# Patient Record
Sex: MALE | Race: BLACK OR AFRICAN AMERICAN | NOT HISPANIC OR LATINO | Employment: UNEMPLOYED | URBAN - METROPOLITAN AREA
[De-identification: names, ages, dates, MRNs, and addresses within clinical notes are randomized per-mention and may not be internally consistent; named-entity substitution may affect disease eponyms.]

---
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
## 2021-02-10 ENCOUNTER — TELEPHONE (OUTPATIENT)
Dept: NEUROLOGY | Facility: CLINIC | Age: 21
End: 2021-02-10

## 2021-02-10 NOTE — TELEPHONE ENCOUNTER
Called to remind patient of their upcoming appointment with Dr Jessica Velasquez in [facility] SPECIAL[facility]  Voicemail left to advise patient to call back with any urgent symptoms or concerns  Patient also made aware that we will be calling back two days prior to appointment to complete the COVID screening

## 2021-02-23 NOTE — PROGRESS NOTES
[facility] Consult - Follow up   PATIENT:  Mosie Epley  MRN:  [id number]  :  2000  DATE OF SERVICE:  2021  REFERRED BY: Tamiko Esteban*  PMD: No primary care provider on file  Assessment/Plan:     Joel Cain is a delightful 20 y  o  male with a past medical history that includes anxiety, asthma as a child and no longer, myofascial pain syndrome, chronic back pain referred here for evaluation of headache  My initial evaluation 2020  Follow-up 2020, 8/3/2020, 2020, 2021       Chronic daily headache  Chronic migraine without aura without status migrainosus, not intractable  Cervicalgia  Anxiety   Zach reports a long history of headaches and migraines as well as a strong family history of migraines  Johan Morgan reports pain is typically retro-orbital, frontal and temporal and may be occipital and parietal   He denies aura and reports typical associated migrainous features without significant autonomic features  - frequency as of 2020: headaches or migraines 25 days a month, more than 50% are migraines he thinks  - As of 2020:  No improvement in headaches or migraines   Just increased amitriptyline to 50 mg last week and will try this for 2 months before declaring whether is effective or not   Rizatriptan may help somewhat as abortive will also add prochlorperazine as the alternative abortive option  - As of 8/3/2020:  No improvement in headaches or migraines amitriptyline and therefore will wean off   Will see what cardiologist thinks at upcoming appointment and may consider starting propranolol next    trial of prochlorperazine for abortive  - As of 2020:  He reports he has not noticed improvement in headaches or migraines although previously he was reporting 25 migraine days per month, today reporting 8 migraine days per month    He is now off amitriptyline and we will try venlafaxine for migraine and headache prevention which may also help with mood  Rizatriptan and prochlorperazine do not seem to do much for headaches per patient  Likely anxiety component headaches as well  - as of 2/24/2021: He continues to have daily headaches that turn into migraines  He self discontinued venlfaxine 75 mg after 3 month trial  Will start trial of propranolol       Workup:  - MRI brain with without contrast 12/27/2019:  Unremarkable     Preventative:  -referral to physical therapy placed 06/01/2020   - we discussed headache hygiene and lifestyle factors that may improve headaches  - trial of propranolol with gradual titration  Discussed proper use, possible side effects and risks  - past/failed: supplements, amitriptyline, venlafaxine 75 mg  - future options:  topiramate, CGRP med      Abortive:  - discussed not taking over-the-counter or prescription pain medications more than 3 days per week to prevent medication overuse/rebound headache  -     rizatriptan (MAXALT) 10 MG tablet; Take 1 tablet (10 mg total) by mouth once as needed for migraine May repeat in 2 hours if needed  Max 2/24 hours, 9/month  Discussed proper use, possible side effects and risks    -  prochlorperazine/Compazine 10 mg as alternative abortive  Discussed proper use, possible side effects and risks  - past/failed:  Methocarbamol, naproxen, ibuprofen  - future options:  Alternative Triptan,  metoclopramide, Toradol IM or p o , could consider trial for 5 days of Depakote or dexamethasone 4 prolonged migraine, bean Siddiqui        Patient instructions     Get Dilated Eye exam   Referral to physical therapy placed again      Headache/migraine treatment:   Abortive medications (for immediate treatment of a headache):    It is ok to take ibuprofen, acetaminophen or naproxen (Advil, Tylenol,  Aleve, Excedrin) if they help your headaches you should limit these to No more than 3 times a week to avoid medication overuse/rebound headaches       Option 1:  For your more moderate to severe migraines take this medication early  Maxalt (rizatriptan) 10mg tabs - take one at the onset of headache  May repeat one time after 1-2 hours if pain has not resolved  (Max 2 a day and 9 a month)     Option 2:   Prochlorperazine /Compazine 10 mg as needed for alternative medicine for headache/migraine or nausea     Prescription preventive medications for headaches/migraines   (to take every day to help prevent headaches - not to take at the time of headache):  [x]? ? trial of propranolol   10 mg BID for 2 weeks, if needed/tolerated increase to 20 mg BID for 1 week, if needed/tolerated increase to 30 mg BID for 1 week, if needed/tolerated increase to 40 mg BID       *Typically these types of medications take time untill you see the benefit, although some may see improvement in days, often it may take weeks, especially if the medication is being titrated up to a beneficial level  Please contact us if there are any concerns or questions regarding the medication          Self-Monitoring:  [x]? ? Headache calendar  Each day marcin a number from 0-10 indicating if there was a headache and how bad it was   This can be used to monitor gradual improvement and is helpful to make medication adjustments  You can do this on paper or there is an JONATHAN for a smart phone called "Migraine e Diary"       Lifestyle Recommendations:  [x]? ? SLEEP - Maintain a regular sleep schedule: Adults need at least 7-8 hours of uninterrupted a night  Maintain good sleep hygiene:  Going to bed and waking up at consistent times, avoiding excessive daytime naps, avoiding caffeinated beverages in the evening, avoid excessive stimulation in the evening and generally using bed primarily for sleeping   One hour before bedtime would recommend turning lights down lower, decreasing your activity (may read quietly, listen to music at a low volume)   When you get into bed, should eliminate all technology (no texting, emailing, playing with your phone, iPad or tablet in bed)  [x]?? HYDRATION - Maintain good hydration   Drink  2L of fluid a day (4 typical small water bottles)  [x]? ? DIET - Maintain good nutrition  In particular don't skip meals and try and eat healthy balanced meals regularly  [x]? ? TRIGGERS - Look for other triggers and avoid them: Limit caffeine to 1-2 cups a day or less  Avoid dietary triggers that you have noticed bring on your headaches (this could include aged cheese, peanuts, MSG, aspartame and nitrates)  [x]? ? EXERCISE - physical exercise as we all know is good for you in many ways, and not only is good for your heart, but also is beneficial for your mental health, cognitive health and  chronic pain/headaches  I would encourage at the least 5 days of physical exercise weekly for at least 30 minutes       Education and Follow-up  [x]? ? Please call with any questions or concerns  Of course if any new concerning symptoms go to the emergency department  [x]? ? Follow up 3 months with Dr Regla Ignacio and 6 months with Daniela Pelayo         CC: We had the pleasure of evaluating Zach Cain in neurological consultation today  Angelo Cain is a  right handed male who presents today for evaluation of headaches       History obtained from patient as well as available medical record review    History of Present Illness:     Interval history as of 02/24/2021  - he did not follow up with PA while I was out as requested  - not working, quit school, "im doing my own research"  - looking into doing aqua therapy for back pain   - dealing with other chronic pains - through other providers - methocarbamol robaxin   - eye exam - next month   - PT  - has not done  - normal TTE in 9/4/20, negative holter, following with cardiology, ED 9/6/20 for palpitations     Headaches/migraines  - daily headaches and turns into migraine  Preventative: venlafaxine 75 mg - tried for 3 months and self discontinued   Abortive: rizatriptan does not seem to help Prochlorperazine - helps lower it down a bit     Interval history as of 08/31/2020:   - saw cardiology 08/12/2020, please see EMR for details, Holter monitor was normal  -ED 08/29/2020 for chest pain, anxiety, EKG - sinus rhythm and diffuse ST T wave changes elevations but it is no different than EKG from 07/21/2020, chest x-ray negative, lab workup unremarkable and they recommended follow-up with cardiology (switching to his dads cardiology at )  - anxiety has been better per patient     Headaches and migraines - unchanged, he reports 8 days a month  Preventative: not on any right now     Abortive: rizatripan or Prochlorperazine - dont seem to do anything         Interval history as of 08/3/2020:  07/20/2020 presented to ED with chest pain for 3 days  -  EKG 7/20/20 sinus rhythm with short WY, nonspecific T-wave abnormality, rate 77,   - prescribed outpatient Holter monitor and cardiology follow-up     07/22/2020 he followed up with primary care provider     He has not seen Physical therapy since last visit   Eye exam - not gone yet      Headaches and migraines - unchanged, nearly daily, frontal/temporal mainly, sometimes in back or diffuse  He continues to take Amitriptyline 50 mg  Rizatriptan - tried it twice and did not help   prochlorperazine - has not tried        Interval history as of 6/1/2020:   Headaches and migraines - he has not yet seen improvement, keeping track  Amitriptyline - at 50 mg nightly - no side effects   Rizatriptan - tried one and helped a little   No new or concerning symptoms         Headaches started at what age? 16years old  How often do the headaches occur?   - as of 4/23/2020: headaches or migraines 25 days a month, more than 50% are migraines he thinks   - As of 6/1/2020: headaches or migraines 25 days a month  - as of 8/3/2020  headaches or migraines 25 days a month  What time of the day do the headaches start?  There when he wakes up or later in the day   How long do the headaches last? Over 6 hours up to 2 days  Are you ever headache free? Yes     Aura? without aura     Last eye exam: years ago     Where is your headache located and pain quality?   - starts with retro-orbital pain, frontal and temporal and occipital and parietal    - usually bilateral   - fuzzy in the beginning, then throbbing and horrible   What is the intensity of pain? Average: 5/10, worst 12/10  Associated symptoms:   [x]? ? Nausea       []? ? Vomiting        []? ?Louetta Shade  [x]? ? Insomnia    []? ? Stiff or sore neck   [x]? ? Problems with concentration  [x]? ? Photophobia     [x]? ?Phonophobia      []? ? Osmophobia  []? ? Blurred vision   [x]? ? Prefer quiet, dark room  [x]? ? Light-headed or dizzy     []? ? Tinnitus   []? ? Hands or feet tingle or feel numb/paresthesias       []? ? Red ear      []? ? Ptosis      []? ? Facial droop  [x]? ? Lacrimation - rarely   []? ? Nasal congestion/rhinorrhea   []? ? Flushing of face  []? ? Change in pupil size     Number of days missed per month because of headaches:  Work (or school) days:  2-3 weeks in a row once before, 10 days on avg in a month      Things that make the headache worse? No specific movements, any movement      Headache triggers:  Lights, sounds      Have you seen someone else for headaches or pain? Yes, PCP   Have you had trigger point injection performed and how often? No  Have you had Botox injection performed and how often? No   Have you had epidural injections or transforaminal injections performed? No  Have you ever had any Brain imaging? Yes     What medications do you take or have you taken for your headaches?    ABORTIVE:    OTC medications have been ineffective      naproxen  Methocarbamol for back pain - thinks he takes every day - does not help   Rizatriptan has not seemed to help   exedrin helped in the past      PREVENTIVE:         Magnesium     Past:  Rizatriptan      Alternative therapies used in the past for headaches? no other headache interventions have been tried        LIFESTYLE  Sleep   - averages: 5 hours  Problems falling asleep?:   Yes  Problems staying asleep?:  Yes        Physical activity: stretches for PT, back makes other exercise hard      Water: 8 bottles per day  Caffeine: 1 per day     Mood:   - anxiety, maybe mild depression, no SI     The following portions of the patient's history were reviewed and updated as appropriate: allergies, current medications, past family history, past medical history, past social history, past surgical history and problem list      Pertinent family history:  Family history of headaches:  migraine headaches in mother and father  Any family history of aneurysms - No     Pertinent social history:  Melanie Stephenson fall 2020 [employer] studying media production, [employer] design, [employer]  Lives with mom, dad, sister      Illicit Drugs: denies  Alcohol/tobacco: Denies alcohol use, Denies tobacco use      Past Medical History:     Past Medical History:   Diagnosis Date    Anxiety     Asthma     Migraines        Patient Active Problem List   Diagnosis    Low back pain    Myofascial pain syndrome    Nausea    Anxiety    Failed vision screen    Other headache syndrome    Palpitations       Medications:      Current Outpatient Medications   Medication Sig Dispense Refill    co-enzyme Q-10 30 mg Take 30 mg by mouth Three times a day      LORazepam (Ativan) 1 mg tablet Take 1 tablet (1 mg total) by mouth 3 (three) times a day as needed for anxiety 15 tablet 0    multivitamin (THERAGRAN) TABS Take 1 tablet by mouth daily      naproxen (NAPROSYN) 500 mg tablet TAKE 1/2 TABLET BY MOUTH TWICE A DAY WITH MEALS 15 tablet 0    prochlorperazine (COMPAZINE) 10 mg tablet Take 1 tablet (10 mg total) by mouth every 6 (six) hours as needed (migraine) 12 tablet 3    rizatriptan (MAXALT) 10 MG tablet Take 1 tablet (10 mg total) by mouth once as needed for migraine May repeat in 2 hours if needed  Max 2/24 hours, 9/month  9 tablet 3    venlafaxine (EFFEXOR-XR) 37 5 mg 24 hr capsule Take 1 capsule (37 5 mg total) by mouth daily for 7 days Then increase to 75 mg daily 7 capsule 0    venlafaxine (EFFEXOR-XR) 75 mg 24 hr capsule Take 1 capsule (75 mg total) by mouth daily 30 capsule 3     No current facility-administered medications for this visit           Allergies:    No Known Allergies    Family History:     Family History   Problem Relation Age of Onset   Kristy Seller Migraines Mother     Hypertension Father     Migraines Father     Migraines Maternal Grandmother     Migraines Maternal Grandfather        Social History:     Social History     Socioeconomic History    Marital status: Unknown     Spouse name: Not on file    Number of children: Not on file    Years of education: Not on file    Highest education level: Not on file   Occupational History    Not on file   Social Needs    Financial resource strain: Not on file    Food insecurity     Worry: Not on file     Inability: Not on file    Transportation needs     Medical: Not on file     Non-medical: Not on file   Tobacco Use    Smoking status: Never Smoker    Smokeless tobacco: Never Used   Substance and Sexual Activity    Alcohol use: No    Drug use: Yes     Types: Marijuana     Comment: occasional    Sexual activity: Yes     Partners: Female     Birth control/protection: Condom Male     Comment: [phone] is Robin's personal cellular number   Lifestyle    Physical activity     Days per week: Not on file     Minutes per session: Not on file    Stress: Not on file   Relationships    Social connections     Talks on phone: Not on file     Gets together: Not on file     Attends Yazidism service: Not on file     Active member of club or organization: Not on file     Attends meetings of clubs or organizations: Not on file     Relationship status: Not on file    Intimate partner violence     Fear of current or ex partner: Not on file     Emotionally abused: Not on file Physically abused: Not on file     Forced sexual activity: Not on file   Other Topics Concern    Not on file   Social History Narrative    Lives with Mom, Dad , sister (older), dog- pit bull -         No stressors noted        Noted to be a worrier- anxiety- longstanding-    No treatment - in past nor now  Is interested in therapy! Objective:       Physical Exam:                                                                 Vitals:            Constitutional:    /72 (BP Location: Left arm, Patient Position: Sitting, Cuff Size: Standard)   Pulse 72   Ht 5' 11" (1 803 m)   Wt 77 1 kg (170 lb)   BMI 23 71 kg/m²   BP Readings from Last 3 Encounters:   02/24/21 130/72   09/28/20 124/70   09/06/20 130/64     Pulse Readings from Last 3 Encounters:   02/24/21 72   09/28/20 85   09/06/20 60         Well developed, well nourished, well groomed  No dysmorphic features  Psychiatric:  Normal behavior and appropriate affect        Neurological Examination:     Mental status/cognitive function:   Recent and remote memory intact  Attention span and concentration as well as fund of knowledge are appropriate for age  Normal language and spontaneous speech  Cranial Nerves:  III, IV, VI-Pupils were equal, round  Extraocular movements were full and conjugate   VII-facial expression symmetric  VIII-hearing grossly intact bilaterally   Motor Exam: symmetric bulk throughout  no atrophy, fasciculations or abnormal movements noted     Coordination:  no apparent dysmetria, ataxia or tremor noted  Gait: steady casual gait        Pertinent lab results:   08/29/2020 BMP and CBC unremarkable     07/20/2020 BMP unremarkable  CBC significant for hemoglobin 17 5  Troponin negative  TSH slightly low at 0 4, free T4 normal 0 8, free T3 slightly low  COVID negative    EKG 7/20/20 sinus rhythm with short WY, nonspecific T-wave abnormality, rate 77,      Imaging:   I have personally reviewed imaging and radiology read   - MRI brain with without contrast 12/27/2019:  Unremarkable  Review of Systems:   ROS obtained by medical assistant Personally reviewed and updated if indicated  Review of Systems   Constitutional: Negative  Negative for appetite change and fever  HENT: Negative  Negative for hearing loss, tinnitus, trouble swallowing and voice change  Eyes: Positive for photophobia  Negative for pain (both eyes)  Respiratory: Negative  Negative for shortness of breath  Cardiovascular: Negative  Negative for palpitations  Gastrointestinal: Negative  Negative for nausea and vomiting  Endocrine: Negative  Negative for cold intolerance  Genitourinary: Negative  Negative for dysuria, frequency and urgency  Musculoskeletal: Negative  Negative for myalgias and neck pain  Skin: Negative  Negative for rash  Neurological: Positive for light-headedness and headaches (daily)  Negative for dizziness, tremors, seizures, syncope, facial asymmetry, speech difficulty, weakness and numbness  Hematological: Negative  Does not bruise/bleed easily  Psychiatric/Behavioral: Negative  Negative for confusion, hallucinations and sleep disturbance  I have spent 23 minutes with Patient  today in which greater than 50% of this time was spent in counseling/coordination of care  I also spent 10 minutes non face to face for this patient the same day         Author:  John Paul Sarah MD 2/24/2021 12:42 PM

## 2021-02-24 ENCOUNTER — OFFICE VISIT (OUTPATIENT)
Dept: NEUROLOGY | Facility: CLINIC | Age: 21
End: 2021-02-24
Payer: COMMERCIAL

## 2021-02-24 VITALS
HEIGHT: 71 IN | BODY MASS INDEX: 23.8 KG/M2 | SYSTOLIC BLOOD PRESSURE: 130 MMHG | HEART RATE: 72 BPM | DIASTOLIC BLOOD PRESSURE: 72 MMHG | WEIGHT: 170 LBS

## 2021-02-24 DIAGNOSIS — M54.2 CERVICALGIA: ICD-10-CM

## 2021-02-24 DIAGNOSIS — R51.9 CHRONIC DAILY HEADACHE: Primary | ICD-10-CM

## 2021-02-24 DIAGNOSIS — G43.709 CHRONIC MIGRAINE WITHOUT AURA WITHOUT STATUS MIGRAINOSUS, NOT INTRACTABLE: ICD-10-CM

## 2021-02-24 PROCEDURE — 99214 OFFICE O/P EST MOD 30 MIN: CPT | Performed by: PSYCHIATRY & NEUROLOGY

## 2021-02-24 PROCEDURE — 3008F BODY MASS INDEX DOCD: CPT | Performed by: PSYCHIATRY & NEUROLOGY

## 2021-02-24 RX ORDER — METHOCARBAMOL 750 MG/1
750 TABLET, FILM COATED ORAL 4 TIMES DAILY PRN
COMMUNITY
Start: 2021-02-15

## 2021-02-24 RX ORDER — PROPRANOLOL HYDROCHLORIDE 10 MG/1
TABLET ORAL
Qty: 240 TABLET | Refills: 3 | Status: SHIPPED | OUTPATIENT
Start: 2021-02-24

## 2021-02-24 RX ORDER — MELOXICAM 15 MG/1
15 TABLET ORAL DAILY
COMMUNITY
Start: 2021-02-15

## 2021-02-24 RX ORDER — PROPRANOLOL HYDROCHLORIDE 10 MG/1
TABLET ORAL
Qty: 240 TABLET | Refills: 3 | Status: SHIPPED | OUTPATIENT
Start: 2021-02-24 | End: 2021-02-24 | Stop reason: SDUPTHER

## 2021-02-24 NOTE — PROGRESS NOTES
Review of Systems    {LimROS-complete:83253}      Review of Systems   Constitutional: Negative  Negative for appetite change and fever  HENT: Negative  Negative for hearing loss, tinnitus, trouble swallowing and voice change  Eyes: Positive for photophobia  Negative for pain (both eyes)  Respiratory: Negative  Negative for shortness of breath  Cardiovascular: Negative  Negative for palpitations  Gastrointestinal: Negative  Negative for nausea and vomiting  Endocrine: Negative  Negative for cold intolerance  Genitourinary: Negative  Negative for dysuria, frequency and urgency  Musculoskeletal: Negative  Negative for myalgias and neck pain  Skin: Negative  Negative for rash  Neurological: Positive for light-headedness and headaches (daily)  Negative for dizziness, tremors, seizures, syncope, facial asymmetry, speech difficulty, weakness and numbness  Hematological: Negative  Does not bruise/bleed easily  Psychiatric/Behavioral: Negative  Negative for confusion, hallucinations and sleep disturbance

## 2021-02-24 NOTE — PATIENT INSTRUCTIONS
Get Dilated Eye exam   Referral to physical therapy placed again      Headache/migraine treatment:   Abortive medications (for immediate treatment of a headache): It is ok to take ibuprofen, acetaminophen or naproxen (Advil, Tylenol,  Aleve, Excedrin) if they help your headaches you should limit these to No more than 3 times a week to avoid medication overuse/rebound headaches       Option 1:  For your more moderate to severe migraines take this medication early  Maxalt (rizatriptan) 10mg tabs - take one at the onset of headache  May repeat one time after 1-2 hours if pain has not resolved  (Max 2 a day and 9 a month)     Option 2:   Prochlorperazine /Compazine 10 mg as needed for alternative medicine for headache/migraine or nausea     Prescription preventive medications for headaches/migraines   (to take every day to help prevent headaches - not to take at the time of headache):  [x]? ? trial of propranolol   10 mg BID for 2 weeks, if needed/tolerated increase to 20 mg BID for 1 week, if needed/tolerated increase to 30 mg BID for 1 week, if needed/tolerated increase to 40 mg BID         *Typically these types of medications take time untill you see the benefit, although some may see improvement in days, often it may take weeks, especially if the medication is being titrated up to a beneficial level  Please contact us if there are any concerns or questions regarding the medication          Self-Monitoring:  [x]? ? Headache calendar  Each day marcin a number from 0-10 indicating if there was a headache and how bad it was   This can be used to monitor gradual improvement and is helpful to make medication adjustments  You can do this on paper or there is an JONATHAN for a smart phone called "Migraine e Diary"       Lifestyle Recommendations:  [x]? ? SLEEP - Maintain a regular sleep schedule: Adults need at least 7-8 hours of uninterrupted a night   Maintain good sleep hygiene:  Going to bed and waking up at consistent times, avoiding excessive daytime naps, avoiding caffeinated beverages in the evening, avoid excessive stimulation in the evening and generally using bed primarily for sleeping   One hour before bedtime would recommend turning lights down lower, decreasing your activity (may read quietly, listen to music at a low volume)  When you get into bed, should eliminate all technology (no texting, emailing, playing with your phone, iPad or tablet in bed)  [x]?? HYDRATION - Maintain good hydration   Drink  2L of fluid a day (4 typical small water bottles)  [x]? ? DIET - Maintain good nutrition  In particular don't skip meals and try and eat healthy balanced meals regularly  [x]? ? TRIGGERS - Look for other triggers and avoid them: Limit caffeine to 1-2 cups a day or less  Avoid dietary triggers that you have noticed bring on your headaches (this could include aged cheese, peanuts, MSG, aspartame and nitrates)  [x]? ? EXERCISE - physical exercise as we all know is good for you in many ways, and not only is good for your heart, but also is beneficial for your mental health, cognitive health and  chronic pain/headaches  I would encourage at the least 5 days of physical exercise weekly for at least 30 minutes       Education and Follow-up  [x]? ? Please call with any questions or concerns  Of course if any new concerning symptoms go to the emergency department  [x]? ? Follow up 3 months with Dr Juan Carlos Torres and 6 months Harris Garsia

## 2021-05-17 ENCOUNTER — TELEPHONE (OUTPATIENT)
Dept: NEUROLOGY | Facility: CLINIC | Age: 21
End: 2021-05-17

## 2021-05-17 NOTE — TELEPHONE ENCOUNTER
Called and left a voicemail for patient - Please call back to confirm upcoming appointment with Dr Carlos Enrique Encinas  Provided patient with apt date, time and location  Informed patient that check in is at least 15 minutes prior to apt time

## 2021-05-24 ENCOUNTER — TRANSCRIBE ORDERS (OUTPATIENT)
Dept: NEUROLOGY | Facility: CLINIC | Age: 21
End: 2021-05-24

## 2021-05-24 DIAGNOSIS — G43.709 CHRONIC MIGRAINE WITHOUT AURA, NOT INTRACTABLE, WITHOUT STATUS MIGRAINOSUS: ICD-10-CM

## 2021-05-24 DIAGNOSIS — R51.9 HEADACHE, UNSPECIFIED: Primary | ICD-10-CM

## 2021-05-24 DIAGNOSIS — M54.2 CERVICALGIA: ICD-10-CM

## 2021-05-26 ENCOUNTER — OFFICE VISIT (OUTPATIENT)
Dept: NEUROLOGY | Facility: CLINIC | Age: 21
End: 2021-05-26
Payer: COMMERCIAL

## 2021-05-26 VITALS
HEART RATE: 69 BPM | HEIGHT: 71 IN | DIASTOLIC BLOOD PRESSURE: 81 MMHG | SYSTOLIC BLOOD PRESSURE: 135 MMHG | WEIGHT: 172 LBS | BODY MASS INDEX: 24.08 KG/M2

## 2021-05-26 DIAGNOSIS — M54.2 CERVICALGIA: ICD-10-CM

## 2021-05-26 DIAGNOSIS — R51.9 HEADACHE, UNSPECIFIED: ICD-10-CM

## 2021-05-26 DIAGNOSIS — G43.709 CHRONIC MIGRAINE WITHOUT AURA, NOT INTRACTABLE, WITHOUT STATUS MIGRAINOSUS: ICD-10-CM

## 2021-05-26 PROCEDURE — 3008F BODY MASS INDEX DOCD: CPT | Performed by: PSYCHIATRY & NEUROLOGY

## 2021-05-26 PROCEDURE — 99214 OFFICE O/P EST MOD 30 MIN: CPT | Performed by: PSYCHIATRY & NEUROLOGY

## 2021-05-26 PROCEDURE — 1036F TOBACCO NON-USER: CPT | Performed by: PSYCHIATRY & NEUROLOGY

## 2021-05-26 NOTE — PROGRESS NOTES
Review of Systems   Constitutional: Negative  Negative for appetite change and fever  HENT: Negative  Negative for hearing loss, tinnitus, trouble swallowing and voice change  Eyes: Negative  Negative for photophobia and pain  Respiratory: Negative  Negative for shortness of breath  Cardiovascular: Negative  Negative for palpitations  Gastrointestinal: Negative  Negative for nausea and vomiting  Endocrine: Negative  Negative for cold intolerance  Genitourinary: Negative  Negative for dysuria, frequency and urgency  Musculoskeletal: Negative  Negative for myalgias and neck pain  Skin: Negative  Negative for rash  Neurological: Positive for headaches (3 per week)  Negative for dizziness, tremors, seizures, syncope, facial asymmetry, speech difficulty, weakness, light-headedness and numbness  Hematological: Negative  Does not bruise/bleed easily  Psychiatric/Behavioral: Negative  Negative for confusion, hallucinations and sleep disturbance

## 2021-05-26 NOTE — PROGRESS NOTES
[address] [facility] Consult - Follow up   PATIENT:  Frankie Lin  MRN:  [id number]  :  2000  DATE OF SERVICE:  2021  REFERRED BY: Court Underwood DO  PMD: Bhargav Tavarez DO    Assessment/Plan:   Kimberly Cain is a delightful 20 y  o  male with a past medical history that includes anxiety, asthma as a child and no longer, myofascial pain syndrome, chronic back pain referred here for evaluation of headache  My initial evaluation 2020  Follow-up 2020, 8/3/2020, 2020, 2021, 2021     Chronic daily headache  Chronic migraine without aura without status migrainosus, not intractable  Anxiety   Zach reports a long history of headaches and migraines as well as a strong family history of migraines  Duke Hernandez reports pain is typically retro-orbital, frontal and temporal and may be occipital and parietal   He denies aura and reports typical associated migrainous features without significant autonomic features  - frequency as of 2020: headaches or migraines 25 days a month, more than 50% are migraines he thinks  - As of 2020:  No improvement in headaches or migraines   Just increased amitriptyline to 50 mg last week and will try this for 2 months before declaring whether is effective or not   Rizatriptan may help somewhat as abortive will also add prochlorperazine as the alternative abortive option  - As of 8/3/2020:  No improvement in headaches or migraines amitriptyline and therefore will wean off   Will see what cardiologist thinks at upcoming appointment and may consider starting propranolol next    trial of prochlorperazine for abortive  - As of 2020:  He reports he has not noticed improvement in headaches or migraines although previously he was reporting 25 migraine days per month, today reporting 8 migraine days per month  Duke Hernandez is now off amitriptyline and we will try venlafaxine for migraine and headache prevention which may also help with mood   Rizatriptan and prochlorperazine do not seem to do much for headaches per patient  Mitchell Shah anxiety component headaches as well  - as of 2/24/2021: He continues to have daily headaches that turn into migraines  He self discontinued venlfaxine 75 mg after 3 month trial  Will start trial of propranolol    - as of 5/26/2021: He reports he is finally doing better with about 3 headaches per week that are not as bad on propranolol 20 mg BID  No migraines in past 3 months severe enough to take rizatriptan or prochlorperazine  Workup:  - MRI brain with without contrast 12/27/2019:  Unremarkable     Preventative:  -referral to physical therapy placed 06/01/2020   - we discussed headache hygiene and lifestyle factors that may improve headaches  - continue propranolol 20 mg BID  Room to increase if needed  Discussed proper use, possible side effects and risks  - past/failed: supplements, amitriptyline, venlafaxine 75 mg  - future options:  topiramate, CGRP med      Abortive:  - discussed not taking over-the-counter or prescription pain medications more than 3 days per week to prevent medication overuse/rebound headache  -     rizatriptan (MAXALT) 10 MG tablet; Take 1 tablet (10 mg total) by mouth once as needed for migraine May repeat in 2 hours if needed  Max 2/24 hours, 9/month  Discussed proper use, possible side effects and risks    -  prochlorperazine/Compazine 10 mg as alternative abortive  Discussed proper use, possible side effects and risks  - past/failed:  Methocarbamol, naproxen, ibuprofen  - future options:  Alternative Triptan,  metoclopramide, Toradol IM or p o , could consider trial for 5 days of Depakote or dexamethasone 4 prolonged migraine, ubrelvy, reyvow        Patient instructions      Referral to physical therapy     Headache/migraine treatment:   Abortive medications (for immediate treatment of a headache):    It is ok to take ibuprofen, acetaminophen or naproxen (Advil, Tylenol,  Aleve, Excedrin) if they help your headaches you should limit these to No more than 3 times a week to avoid medication overuse/rebound headaches       Option 1:  For your more moderate to severe migraines take this medication early  Maxalt (rizatriptan) 10mg tabs - take one at the onset of headache  May repeat one time after 1-2 hours if pain has not resolved  (Max 2 a day and 9 a month)      Option 2:   Prochlorperazine /Compazine 10 mg as needed for alternative medicine for headache/migraine or nausea     Prescription preventive medications for headaches/migraines   (to take every day to help prevent headaches - not to take at the time of headache):  [x]? ??propranolol   10 mg BID for 2 weeks, if needed/tolerated increase to 20 mg BID   (if needed/tolerated increase to 30 mg BID for 1 week, if needed/tolerated increase to 40 mg BID)     *Typically these types of medications take time untill you see the benefit, although some may see improvement in days, often it may take weeks, especially if the medication is being titrated up to a beneficial level  Please contact us if there are any concerns or questions regarding the medication         Lifestyle Recommendations:  [x]? ?? SLEEP - Maintain a regular sleep schedule: Adults need at least 7-8 hours of uninterrupted a night  Maintain good sleep hygiene:  Going to bed and waking up at consistent times, avoiding excessive daytime naps, avoiding caffeinated beverages in the evening, avoid excessive stimulation in the evening and generally using bed primarily for sleeping   One hour before bedtime would recommend turning lights down lower, decreasing your activity (may read quietly, listen to music at a low volume)  When you get into bed, should eliminate all technology (no texting, emailing, playing with your phone, iPad or tablet in bed)  [x]??? HYDRATION - Maintain good hydration   Drink  2L of fluid a day (4 typical small water bottles)  [x]? ?? DIET - Maintain good nutrition   In particular don't skip meals and try and eat healthy balanced meals regularly  [x]? ?? TRIGGERS - Look for other triggers and avoid them: Limit caffeine to 1-2 cups a day or less  Avoid dietary triggers that you have noticed bring on your headaches (this could include aged cheese, peanuts, MSG, aspartame and nitrates)  [x]? ?? EXERCISE - physical exercise as we all know is good for you in many ways, and not only is good for your heart, but also is beneficial for your mental health, cognitive health and  chronic pain/headaches  I would encourage at the least 5 days of physical exercise weekly for at least 30 minutes       Education and Follow-up  [x]? ?? Please call with any questions or concerns  Of course if any new concerning symptoms go to the emergency department  [x]??? Follow up as scheduled Nathen Larson - 9/1/21 and then if doing well can see me in 6 months after or 3 months if not doing well        CC: We had the pleasure of evaluating Zach Cain in neurological consultation today  Rosalinda Sandhoff Pyles is a  right handed male who presents today for evaluation of headaches       History obtained from patient as well as available medical record review    History of Present Illness:   Interval history as of 5/26/2021  - denies any new or concerning neurologic symptoms since last visit  - working on getting license    -   Eye exam - needed glasses   -  Following with physiatry for chronic back pain  - did not try PT for headaches    Headaches and migraines   - He reports he is finally doing better with about 3 headaches per week that are not as bad either 6 5/10  Preventative:   Trial of propranolol - taking 20 mg BID  Abortive:  rizatriptan - has not tried again   Prochlorperazine - has not needed   Denies bothersome side effects    - also having less palpitations         Interval history as of 02/24/2021  - he did not follow up with PA while I was out as requested  - not working, quit school, "im doing my own research"  - looking into doing aqua therapy for back pain   - dealing with other chronic pains - through other providers - methocarbamol robaxin   - eye exam - next month   - PT  - has not done  - normal TTE in 9/4/20, negative holter, following with cardiology, ED 9/6/20 for palpitations      Headaches/migraines  - daily headaches and turns into migraine  Preventative: venlafaxine 75 mg - tried for 3 months and self discontinued   Abortive: rizatriptan does not seem to help   Prochlorperazine - helps lower it down a bit     Interval history as of 08/31/2020:   - saw cardiology 08/12/2020, please see EMR for details, Holter monitor was normal  -ED 08/29/2020 for chest pain, anxiety, EKG - sinus rhythm and diffuse ST T wave changes elevations but it is no different than EKG from 07/21/2020, chest x-ray negative, lab workup unremarkable and they recommended follow-up with cardiology (switching to his [facility] cardiology at )  - anxiety has been better per patient     Headaches and migraines - unchanged, he reports 8 days a month  Preventative: not on any right now     Abortive: rizatripan or Prochlorperazine - dont seem to do anything         Interval history as of 08/3/2020:  07/20/2020 presented to ED with chest pain for 3 days  -  EKG 7/20/20 sinus rhythm with short UT, nonspecific T-wave abnormality, rate 77,   - prescribed outpatient Holter monitor and cardiology follow-up     07/22/2020 he followed up with primary care provider     He has not seen Physical therapy since last visit   Eye exam - not gone yet      Headaches and migraines - unchanged, nearly daily, frontal/temporal mainly, sometimes in back or diffuse  He continues to take Amitriptyline 50 mg  Rizatriptan - tried it twice and did not help   prochlorperazine - has not tried        Interval history as of 6/1/2020:   Headaches and migraines - he has not yet seen improvement, keeping track  Amitriptyline - at 50 mg nightly - no side effects   Rizatriptan - tried one and helped a little   No new or concerning symptoms         Headaches started at what age? 16years old  How often do the headaches occur?   - as of 4/23/2020: headaches or migraines 25 days a month, more than 50% are migraines he thinks   - As of 6/1/2020: headaches or migraines 25 days a month  - as of 8/3/2020  headaches or migraines 25 days a month  What time of the day do the headaches start?  There when he wakes up or later in the day   How long do the headaches last? Over 6 hours up to 2 days  Are you ever headache free? Yes     Aura? without aura     Last eye exam: years ago     Where is your headache located and pain quality?   - starts with retro-orbital pain, frontal and temporal and occipital and parietal    - usually bilateral   - fuzzy in the beginning, then throbbing and horrible   What is the intensity of pain? Average: 5/10, worst 12/10  Associated symptoms:   [x]? ?? Nausea       []??? Vomiting        []??? Diarrhea  [x]? ?? Insomnia    []??? Stiff or sore neck   [x]? ?? Problems with concentration  [x]? ?? Photophobia     [x]? ? ? Phonophobia      []? ?? Osmophobia  []??? Blurred vision   [x]? ?? Prefer quiet, dark room  [x]??? Light-headed or dizzy     []??? Tinnitus   []? ?? Hands or feet tingle or feel numb/paresthesias       []? ?? Red ear      []? ?? Ptosis      []??? Facial droop  [x]? ?? Lacrimation - rarely   []? ?? Nasal congestion/rhinorrhea   []? ?? Flushing of face  []? ?? Change in pupil size     Number of days missed per month because of headaches:  Work (or school) days:  2-3 weeks in a row once before, 10 days on avg in a month      Things that make the headache worse? No specific movements, any movement      Headache triggers:  Lights, sounds      Have you seen someone else for headaches or pain? Yes, PCP   Have you had trigger point injection performed and how often? No  Have you had Botox injection performed and how often? No   Have you had epidural injections or transforaminal injections performed? No  Have you ever had any Brain imaging? Yes     What medications do you take or have you taken for your headaches?    ABORTIVE:    OTC medications have been ineffective      naproxen  Methocarbamol for back pain - thinks he takes every day - does not help   Rizatriptan has not seemed to help   exedrin helped in the past      PREVENTIVE:         Magnesium     Past:  Rizatriptan      Alternative therapies used in the past for headaches? no other headache interventions have been tried        LIFESTYLE  Sleep   - averages: 5 hours  Problems falling asleep?:   Yes  Problems staying asleep?:  Yes        Physical activity: stretches for PT, back makes other exercise hard      Water: 8 bottles per day  Caffeine: 1 per day     Mood:   - anxiety, maybe mild depression, no SI     The following portions of the patient's history were reviewed and updated as appropriate: allergies, current medications, past family history, past medical history, past social history, past surgical history and problem list      Pertinent family history:  Family history of headaches:  migraine headaches in mother and father  Any family history of aneurysms - No     Pertinent social history:  Education: starting fall 2020 [employer] studying media production, [employer], [employer]  Lives with mom, dad, sister      Illicit Drugs: denies  Alcohol/tobacco: Denies alcohol use, Denies tobacco use      Past Medical History:     Past Medical History:   Diagnosis Date    Anxiety     Asthma     Migraines        Patient Active Problem List   Diagnosis    Low back pain    Myofascial pain syndrome    Nausea    Anxiety    Failed vision screen    Other headache syndrome    Palpitations       Medications:      Current Outpatient Medications   Medication Sig Dispense Refill    meloxicam (MOBIC) 15 mg tablet Take 15 mg by mouth daily      methocarbamol (ROBAXIN) 750 mg tablet Take 750 mg by mouth 4 (four) times a day as needed      multivitamin (THERAGRAN) TABS Take 1 tablet by mouth daily      naproxen (NAPROSYN) 500 mg tablet TAKE 1/2 TABLET BY MOUTH TWICE A DAY WITH MEALS 15 tablet 0    prochlorperazine (COMPAZINE) 10 mg tablet Take 1 tablet (10 mg total) by mouth every 6 (six) hours as needed (migraine) 12 tablet 3    propranolol (INDERAL) 10 mg tablet 10 mg BID for 2 weeks, if needed/tolerated increase to 20 mg BID for 1 week, if needed/tolerated increase to 30 mg BID for 1 week, if needed/tolerated increase to 40 mg BID (Patient taking differently: Take 20 mg by mouth 2 (two) times a day 10 mg BID for 2 weeks, if needed/tolerated increase to 20 mg BID for 1 week, if needed/tolerated increase to 30 mg BID for 1 week, if needed/tolerated increase to 40 mg BID) 240 tablet 3    rizatriptan (MAXALT) 10 MG tablet Take 1 tablet (10 mg total) by mouth once as needed for migraine May repeat in 2 hours if needed  Max 2/24 hours, 9/month  9 tablet 3     No current facility-administered medications for this visit           Allergies:    No Known Allergies    Family History:     Family History   Problem Relation Age of Onset   Earna Emily Migraines Mother     Hypertension Father     Migraines Father     Migraines Maternal Grandmother     Migraines Maternal Grandfather        Social History:     Social History     Socioeconomic History    Marital status: Unknown     Spouse name: Not on file    Number of children: Not on file    Years of education: Not on file    Highest education level: Not on file   Occupational History    Not on file   Social Needs    Financial resource strain: Not on file    Food insecurity     Worry: Not on file     Inability: Not on file    Transportation needs     Medical: Not on file     Non-medical: Not on file   Tobacco Use    Smoking status: Former Smoker     Quit date:      Years since quittin 4    Smokeless tobacco: Never Used   Substance and Sexual Activity    Alcohol use: No    Drug use: Yes     Types: Marijuana     Comment: occasional    Sexual activity: Yes     Partners: Female     Birth control/protection: Condom Male     Comment: [phone] is Zach's personal cellular number   Lifestyle    Physical activity     Days per week: Not on file     Minutes per session: Not on file    Stress: Not on file   Relationships    Social connections     Talks on phone: Not on file     Gets together: Not on file     Attends Buddhism service: Not on file     Active member of club or organization: Not on file     Attends meetings of clubs or organizations: Not on file     Relationship status: Not on file    Intimate partner violence     Fear of current or ex partner: Not on file     Emotionally abused: Not on file     Physically abused: Not on file     Forced sexual activity: Not on file   Other Topics Concern    Not on file   Social History Narrative    Lives with Mom, Dad , sister (older), dog- pit bull -         No stressors noted        Noted to be a worrier- anxiety- longstanding-    No treatment - in past nor now  Is interested in therapy! Objective:       Physical Exam:                                                                 Vitals:            Constitutional:    /81 (BP Location: Left arm, Patient Position: Sitting, Cuff Size: Standard)   Pulse 69   Ht 5' 11" (1 803 m)   Wt 78 kg (172 lb)   BMI 23 99 kg/m²   BP Readings from Last 3 Encounters:   05/26/21 135/81   02/24/21 130/72   09/28/20 124/70     Pulse Readings from Last 3 Encounters:   05/26/21 69   02/24/21 72   09/28/20 85         Well developed, well nourished, well groomed  No dysmorphic features  HEENT:  Normocephalic atraumatic  See neuro exam   Chest:  Respirations appear regular and unlabored  Cardiovascular:  Regular rate, no observed significant swelling  Musculoskeletal:  (see below under neurologic exam for evaluation of motor function and gait)   Skin:  warm and dry, not diaphoretic   No apparent birthmarks or stigmata of neurocutaneous disease  Psychiatric:  Normal behavior and appropriate affect        Neurological Examination:     Mental status/cognitive function:   Recent and remote memory intact  Attention span and concentration as well as fund of knowledge are appropriate for age  Normal language and spontaneous speech  Cranial Nerves:  III, IV, VI-Pupils were equal, round  Extraocular movements were full and conjugate   VII-facial expression symmetric  VIII-hearing grossly intact bilaterally   Motor Exam: symmetric bulk throughout  no atrophy, fasciculations or abnormal movements noted  Coordination:  no apparent dysmetria, ataxia or tremor noted  Gait: steady casual gait         Pertinent lab results:   08/29/2020 BMP and CBC unremarkable     07/20/2020 BMP unremarkable  CBC significant for hemoglobin 17 5  Troponin negative  TSH slightly low at 0 4, free T4 normal 0 8, free T3 slightly low  COVID negative    EKG 7/20/20 sinus rhythm with short IA, nonspecific T-wave abnormality, rate 77,      Imaging:   I have personally reviewed imaging and radiology read   - MRI brain with without contrast 12/27/2019:  Unremarkable    Review of Systems:   ROS obtained by medical assistant Personally reviewed and updated if indicated  Review of Systems   Constitutional: Negative  Negative for appetite change and fever  HENT: Negative  Negative for hearing loss, tinnitus, trouble swallowing and voice change  Eyes: Negative  Negative for photophobia and pain  Respiratory: Negative  Negative for shortness of breath  Cardiovascular: Negative  Negative for palpitations  Gastrointestinal: Negative  Negative for nausea and vomiting  Endocrine: Negative  Negative for cold intolerance  Genitourinary: Negative  Negative for dysuria, frequency and urgency  Musculoskeletal: Negative  Negative for myalgias and neck pain  Skin: Negative  Negative for rash     Neurological: Positive for headaches (3 per week)  Negative for dizziness, tremors, seizures, syncope, facial asymmetry, speech difficulty, weakness, light-headedness and numbness  Hematological: Negative  Does not bruise/bleed easily  Psychiatric/Behavioral: Negative  Negative for confusion, hallucinations and sleep disturbance  I have spent 17 minutes with Patient  today in which greater than 50% of this time was spent in counseling/coordination of care regarding Diagnostic results, Prognosis, Risks and benefits of tx options, Intructions for management, Patient education, Importance of tx compliance and Impressions  I also spent 14 minutes non face to face for this patient the same day         Author:  Shravan Pierre MD 5/26/2021 1:02 PM

## 2021-05-26 NOTE — PATIENT INSTRUCTIONS
Referral to physical therapy     Headache/migraine treatment:   Abortive medications (for immediate treatment of a headache): It is ok to take ibuprofen, acetaminophen or naproxen (Advil, Tylenol,  Aleve, Excedrin) if they help your headaches you should limit these to No more than 3 times a week to avoid medication overuse/rebound headaches       Option 1:  For your more moderate to severe migraines take this medication early  Maxalt (rizatriptan) 10mg tabs - take one at the onset of headache  May repeat one time after 1-2 hours if pain has not resolved  (Max 2 a day and 9 a month)      Option 2:   Prochlorperazine /Compazine 10 mg as needed for alternative medicine for headache/migraine or nausea     Prescription preventive medications for headaches/migraines   (to take every day to help prevent headaches - not to take at the time of headache):  [x]? ??propranolol   10 mg BID for 2 weeks, if needed/tolerated increase to 20 mg BID   (if needed/tolerated increase to 30 mg BID for 1 week, if needed/tolerated increase to 40 mg BID)     *Typically these types of medications take time untill you see the benefit, although some may see improvement in days, often it may take weeks, especially if the medication is being titrated up to a beneficial level  Please contact us if there are any concerns or questions regarding the medication         Lifestyle Recommendations:  [x]? ?? SLEEP - Maintain a regular sleep schedule: Adults need at least 7-8 hours of uninterrupted a night  Maintain good sleep hygiene:  Going to bed and waking up at consistent times, avoiding excessive daytime naps, avoiding caffeinated beverages in the evening, avoid excessive stimulation in the evening and generally using bed primarily for sleeping   One hour before bedtime would recommend turning lights down lower, decreasing your activity (may read quietly, listen to music at a low volume)   When you get into bed, should eliminate all technology (no texting, emailing, playing with your phone, iPad or tablet in bed)  [x]??? HYDRATION - Maintain good hydration   Drink  2L of fluid a day (4 typical small water bottles)  [x]? ?? DIET - Maintain good nutrition  In particular don't skip meals and try and eat healthy balanced meals regularly  [x]? ?? TRIGGERS - Look for other triggers and avoid them: Limit caffeine to 1-2 cups a day or less  Avoid dietary triggers that you have noticed bring on your headaches (this could include aged cheese, peanuts, MSG, aspartame and nitrates)  [x]? ?? EXERCISE - physical exercise as we all know is good for you in many ways, and not only is good for your heart, but also is beneficial for your mental health, cognitive health and  chronic pain/headaches  I would encourage at the least 5 days of physical exercise weekly for at least 30 minutes       Education and Follow-up  [x]? ?? Please call with any questions or concerns  Of course if any new concerning symptoms go to the emergency department    [x]??? Follow up as scheduled [facility] [place] - 9/1/21 and then if doing well can see me in 6 months after or 3 months if not doing well

## 2024-02-21 PROBLEM — Z01.01 FAILED VISION SCREEN: Status: RESOLVED | Noted: 2019-10-09 | Resolved: 2024-02-21

## 2025-01-02 ENCOUNTER — OFFICE VISIT (OUTPATIENT)
Age: 25
End: 2025-01-02
Payer: COMMERCIAL

## 2025-01-02 VITALS — HEIGHT: 71 IN | WEIGHT: 172 LBS | BODY MASS INDEX: 24.08 KG/M2

## 2025-01-02 DIAGNOSIS — M54.50 LOW BACK PAIN WITHOUT SCIATICA, UNSPECIFIED BACK PAIN LATERALITY, UNSPECIFIED CHRONICITY: ICD-10-CM

## 2025-01-02 DIAGNOSIS — M99.04 SEGMENTAL DYSFUNCTION OF SACRAL REGION: ICD-10-CM

## 2025-01-02 DIAGNOSIS — M79.18 MYOFASCIAL PAIN SYNDROME: ICD-10-CM

## 2025-01-02 DIAGNOSIS — M99.01 SEGMENTAL DYSFUNCTION OF CERVICAL REGION: ICD-10-CM

## 2025-01-02 DIAGNOSIS — M99.02 SEGMENTAL DYSFUNCTION OF THORACIC REGION: ICD-10-CM

## 2025-01-02 DIAGNOSIS — M99.03 SEGMENTAL DYSFUNCTION OF LUMBAR REGION: Primary | ICD-10-CM

## 2025-01-02 PROCEDURE — 98942 CHIROPRACTIC MANJ 5 REGIONS: CPT | Performed by: CHIROPRACTOR

## 2025-01-02 PROCEDURE — 99203 OFFICE O/P NEW LOW 30 MIN: CPT | Performed by: CHIROPRACTOR

## 2025-01-02 RX ORDER — HYDROXYZINE HYDROCHLORIDE 25 MG/1
25 TABLET, FILM COATED ORAL EVERY 6 HOURS PRN
COMMUNITY
Start: 2024-10-05

## 2025-01-02 RX ORDER — BUPROPION HYDROCHLORIDE 150 MG/1
1 TABLET ORAL EVERY MORNING
COMMUNITY
Start: 2024-12-20

## 2025-01-02 RX ORDER — VILAZODONE HYDROCHLORIDE 10 MG/1
10 TABLET ORAL DAILY
COMMUNITY
Start: 2024-12-20

## 2025-01-02 RX ORDER — CLINDAMYCIN PHOSPHATE 10 UG/ML
LOTION TOPICAL
COMMUNITY
Start: 2024-12-19

## 2025-01-02 NOTE — PROGRESS NOTES
Initial date of service: 1/2/25    Diagnoses and all orders for this visit:    Segmental dysfunction of lumbar region    Low back pain without sciatica, unspecified back pain laterality, unspecified chronicity    Myofascial pain syndrome    Segmental dysfunction of sacral region    Segmental dysfunction of thoracic region    Segmental dysfunction of cervical region    Other orders  -     buPROPion (WELLBUTRIN XL) 150 mg 24 hr tablet; Take 1 tablet by mouth every morning  -     vilazodone (VIIBRYD) 10 mg tablet; Take 10 mg by mouth daily  -     clindamycin (CLEOCIN T) 1 % lotion; APPLY TO BUMPS ON LEGS TWICE DAILY  -     hydrOXYzine HCL (ATARAX) 25 mg tablet; Take 25 mg by mouth every 6 (six) hours as needed for anxiety       ASSESSMENT:  No red flags, radiculopathy or neurologic deficit appreciated clinically. Pt's symptoms and exam findings consistent with lower back pain complicated by myofascial pain syndrome secondary to repetitive st/sp injury, exacerbated by postural/ergonomic stressors. Pt responded well to flexion biased stretches and manual mobilization of the affected spinal and myofascial tissues with increased ROM; trial of conservative tx recommended consisting of stretching, graded mobilization/manipulation of the affected spinal and myofascial jt dysfunction, postural/ergonomic education and take home stretches/exercises. If symptoms fail to improve with short trial of conservative care, appropriate imaging and referral will be coordinated.  Spent greater than 30 min c pt discussing hx, pe, ddx, tx options and reviewing notes/imaging    PROCEDURE CODES: 17907, 10030    TREATMENT:  Fear avoidance behavior discussion; encouraged and reassured pt that natural course of condition is to improve over time with adherence to tx plan and home care strategies. Home care recommendations: avoid bed rest, walk (but avoid trails and uneven surfaces), gradual return to activity to tolerance (avoid anything that  peripheralizes symptoms), call if symptoms peripheralize, worsen, or neurologic deficit progresses. Ther-ex: IASTM; discussed post procedure soreness and/or ecchymosis for up to 36 hrs, applied to affected mm hypertonicities; supine hamstring stretch, supine gluteal stretch, side laying QL stretch, single knee to chest stretch, hip flexor pin-and-stretch, alternating prone hip extension, glute bridge, transitional mvmt education, abdominal bracing; greater than 15 min spent performing above mentioned ther-ex to improve ROM/flexibility. Thoracic mobilization/manipulation: prone P-A mob; Lumbar mobilization/manipulation: diversified side laying graded HVLA, flexion-traction; SIJ Manipulation/Mobilization: R/L SIJ HVLA - long axis distraction, byrd drop table maneuver to affected SIJ    HPI:  Zach Cain is a 24 y.o. male  Chief Complaint   Patient presents with   • Neck Pain   • Back Pain     Having pain since middle school, can't sit/ stand for long periods of time, 5/10 pain     The patient presents to the office with lower back and mid back pain since Middle school. The patient reports the lower back is worst. The pt is feeling a little better but then it aggravates it again. The patient went to PT in the past and it made it worse. The patient has went chiro before but only the same adjustment. Not working but did work several years ago at [employer]. Sitting more than 3 hrs aggravates back. Standing for longer then 3 hours. The patient used weights and mountain climbers, hurts after and irritates back. He also plays video games. Pain level is 8/10. Heating pad- heats for an hour, Salonpas, hot shower. He is currently working with a Nutritionist. MRI 5/3/21- [facility] Pt also mentions he is being managed by Neurologist for migraines.     Neck Pain   Associated symptoms include headaches (Migraines). Pertinent negatives include no chest pain, fever, numbness or weakness.   Back Pain  Associated symptoms include  headaches (Migraines). Pertinent negatives include no chest pain, fever, numbness or weakness.     Past Medical History:   Diagnosis Date   • Anxiety    • Asthma    • Migraines       Past Surgical History:   Procedure Laterality Date   • CIRCUMCISION       The following portions of the patient's history were reviewed and updated as appropriate: allergies, past family history, past medical history, past social history, past surgical history, and problem list.  Review of Systems   Constitutional:  Negative for activity change, fatigue, fever and unexpected weight change.   HENT:  Negative for ear pain, hearing loss, sinus pressure, sinus pain, sore throat and tinnitus.    Respiratory:  Negative for chest tightness, shortness of breath, wheezing and stridor.    Cardiovascular:  Negative for chest pain.   Genitourinary:  Negative for flank pain and frequency.   Musculoskeletal:  Positive for back pain, myalgias, neck pain and neck stiffness. Negative for joint swelling.   Skin:  Negative for color change and pallor.   Neurological:  Positive for headaches (Migraines). Negative for dizziness, speech difficulty, weakness and numbness.   Psychiatric/Behavioral:  Negative for agitation and sleep disturbance. The patient is not nervous/anxious.      Physical Exam  Constitutional:       General: He is not in acute distress.     Appearance: Normal appearance.   HENT:      Head: Normocephalic.      Mouth/Throat:      Mouth: Mucous membranes are moist.   Eyes:      Extraocular Movements: Extraocular movements intact.      Conjunctiva/sclera: Conjunctivae normal.      Pupils: Pupils are equal, round, and reactive to light.   Neck:      Vascular: No carotid bruit.   Pulmonary:      Effort: Pulmonary effort is normal.   Chest:      Chest wall: No tenderness.   Abdominal:      General: Abdomen is flat.      Palpations: Abdomen is soft.   Musculoskeletal:         General: Tenderness present. No swelling, deformity or signs of injury.  Normal range of motion.        Arms:       Cervical back: Normal range of motion. Rigidity and tenderness present.      Right lower leg: No edema.      Left lower leg: No edema.   Lymphadenopathy:      Cervical: No cervical adenopathy.   Skin:     General: Skin is warm.      Coloration: Skin is not jaundiced or pale.      Findings: No bruising or erythema.   Neurological:      Mental Status: He is alert and oriented to person, place, and time.      Cranial Nerves: No cranial nerve deficit.      Sensory: No sensory deficit.      Motor: No weakness.      Gait: Gait is intact.      Deep Tendon Reflexes: Reflexes are normal and symmetric.   Psychiatric:         Attention and Perception: Attention normal.         Mood and Affect: Mood and affect normal.         Speech: Speech normal.         Behavior: Behavior normal. Behavior is cooperative.         Thought Content: Thought content normal.         Cognition and Memory: Cognition normal.         Judgment: Judgment normal.       SOFT TISSUE ASSESSMENT Hypertonicity and tenderness palpated B C5-T7, Upper trap, lev scap, Sub Occ, SCM, Middle trap, T10-S1 erector spinae, hip flexor, glute med/min, QL, hamstring JOINT RESTRICTIONS: C6-T5, T10-S1 and R SIJ ORTHO: SI jt point tenderness: +; Kelsea unremarkable for centralization/peripheralization; alejandra's, iliac compression, thigh thrust elicit lbp in R/L SIJ; prone femoral nerve stretch neg for upper lumbar neural tension, elicits R SIJ stiffness; sitting root elicits no lbp on R/L; slump test elicits no neural tension R/L, Spurling- neg, Distraction- neg, Max For compr- Neg    Return in about 1 week (around 1/9/2025) for Recheck.

## 2025-01-07 ENCOUNTER — PROCEDURE VISIT (OUTPATIENT)
Age: 25
End: 2025-01-07
Payer: COMMERCIAL

## 2025-01-07 DIAGNOSIS — M99.02 SEGMENTAL DYSFUNCTION OF THORACIC REGION: ICD-10-CM

## 2025-01-07 DIAGNOSIS — M99.01 SEGMENTAL DYSFUNCTION OF CERVICAL REGION: ICD-10-CM

## 2025-01-07 DIAGNOSIS — M99.03 SEGMENTAL DYSFUNCTION OF LUMBAR REGION: ICD-10-CM

## 2025-01-07 DIAGNOSIS — M99.04 SEGMENTAL DYSFUNCTION OF SACRAL REGION: Primary | ICD-10-CM

## 2025-01-07 PROCEDURE — 98942 CHIROPRACTIC MANJ 5 REGIONS: CPT | Performed by: CHIROPRACTOR

## 2025-01-07 RX ORDER — KETOCONAZOLE 20 MG/ML
1 SHAMPOO, SUSPENSION TOPICAL ONCE
COMMUNITY
Start: 2024-10-10

## 2025-01-07 NOTE — PROGRESS NOTES
Initial date of service: 1/2/25    Diagnoses and all orders for this visit:    Segmental dysfunction of sacral region    Segmental dysfunction of lumbar region    Segmental dysfunction of thoracic region    Segmental dysfunction of cervical region    Other orders  -     ketoconazole (NIZORAL) 2 % shampoo; Apply 1 Application topically once       ASSESSMENT:  Pt's symptoms and exam findings consistent with back pain and neck pain. complicated by myofascial pain syndrome secondary to repetitive st/sp injury, exacerbated by postural/ergonomic stressors. Pt responded well to flexion biased stretches and manual mobilization of the affected spinal and myofascial tissues with increased ROM; trial of conservative tx recommended consisting of stretching, graded mobilization/manipulation of the affected spinal and myofascial jt dysfunction, postural/ergonomic education and take home stretches/exercises. If symptoms fail to improve with short trial of conservative care, appropriate imaging and referral will be coordinated.  - Tolerated treatment well with slight decrease in mm spasm    PROCEDURE CODES: 90207    TREATMENT:  Fear avoidance behavior discussion; encouraged and reassured pt that natural course of condition is to improve over time with adherence to tx plan and home care strategies. Home care recommendations: avoid bed rest, walk (but avoid trails and uneven surfaces), gradual return to activity to tolerance (avoid anything that peripheralizes symptoms), call if symptoms peripheralize, worsen, or neurologic deficit progresses. Ther-ex: IASTM; discussed post procedure soreness and/or ecchymosis for up to 36 hrs, applied to affected mm hypertonicities; supine hamstring stretch, supine gluteal stretch, side laying QL stretch, single knee to chest stretch, hip flexor pin-and-stretch, alternating prone hip extension, glute bridge, transitional mvmt education, abdominal bracing; greater than 15 min spent performing above  mentioned ther-ex to improve ROM/flexibility. Cervical mobilization- PA CT junction HVLA, Cervical manual traction, Thoracic mobilization/manipulation: prone P-A mob; Lumbar mobilization/manipulation: diversified side laying graded HVLA, flexion-traction; Sacrum- Mobilization- Long axis traction, flexion-distraction, SIJ Manipulation/Mobilization: R/L SIJ HVLA - long axis distraction, byrd drop table maneuver to affected SIJ    HPI:  Zach Cain is a 24 y.o. male  Chief Complaint   Patient presents with   • Neck - Follow-up     Neck pain score 5-6      • Back Pain     Mid back pain is achy . Pain score 5-6       The patient presents to the office with lower back and mid back pain since Middle school. The patient reports the lower back is worst. The pt is feeling a little better but then it aggravates it again. The patient went to PT in the past and it made it worse. The patient has went chiro before but only the same adjustment. Not working but did work several years ago at [employer]. Sitting more than 3 hrs aggravates back. Standing for longer then 3 hours. The patient used weights and mountain climbers, hurts after and irritates back. He also plays video games. Pain level is 8/10. Heating pad- heats for an hour, Salonpas, hot shower. He is currently working with a Nutritionist. MRI 5/3/21- [facility] Pt also mentions he is being managed by Neurologist for migraines.   1/13- Pt feels about the same today. 5-6/10 in the neck and back    Neck Pain   Associated symptoms include headaches.   Back Pain  Associated symptoms include headaches.     Past Medical History:   Diagnosis Date   • Anxiety    • Asthma    • Migraines       Past Surgical History:   Procedure Laterality Date   • CIRCUMCISION       The following portions of the patient's history were reviewed and updated as appropriate: allergies, past family history, past medical history, past social history, past surgical history, and problem list.  Review of Systems    Musculoskeletal:  Positive for back pain, myalgias, neck pain and neck stiffness.   Neurological:  Positive for headaches.     Physical Exam  Constitutional:       Appearance: Normal appearance.   Musculoskeletal:         General: Tenderness present. Normal range of motion.        Arms:       Cervical back: Normal range of motion. Rigidity and tenderness present.   Skin:     General: Skin is warm.   Neurological:      Mental Status: He is alert and oriented to person, place, and time.      Gait: Gait is intact.      Deep Tendon Reflexes: Reflexes are normal and symmetric.   Psychiatric:         Attention and Perception: Attention normal.         Mood and Affect: Affect normal.         Speech: Speech normal.         Behavior: Behavior is cooperative.         Cognition and Memory: Cognition normal.       SOFT TISSUE ASSESSMENT Hypertonicity and tenderness palpated B C5-T7, Upper trap, lev scap, Sub Occ, SCM, Middle trap, T10-S1 erector spinae, hip flexor, glute med/min, QL, hamstring JOINT RESTRICTIONS: C6-T5, T10-S1 and R SIJ ORTHO: SI jt point tenderness: +; Kelsea unremarkable for centralization/peripheralization; alejandra's, iliac compression, thigh thrust elicit lbp in R/L SIJ; prone femoral nerve stretch neg for upper lumbar neural tension, elicits R SIJ stiffness; sitting root elicits no lbp on R/L; slump test elicits no neural tension R/L, Spurling- neg, Distraction- neg, Max For compr- Neg    Return in about 1 week (around 1/14/2025) for Recheck.

## 2025-01-21 ENCOUNTER — PROCEDURE VISIT (OUTPATIENT)
Age: 25
End: 2025-01-21
Payer: COMMERCIAL

## 2025-01-21 VITALS
HEART RATE: 86 BPM | SYSTOLIC BLOOD PRESSURE: 124 MMHG | BODY MASS INDEX: 24.08 KG/M2 | HEIGHT: 71 IN | WEIGHT: 172 LBS | DIASTOLIC BLOOD PRESSURE: 84 MMHG

## 2025-01-21 DIAGNOSIS — M79.18 MYOFASCIAL PAIN SYNDROME: ICD-10-CM

## 2025-01-21 DIAGNOSIS — M99.01 SEGMENTAL DYSFUNCTION OF CERVICAL REGION: ICD-10-CM

## 2025-01-21 DIAGNOSIS — M99.04 SEGMENTAL DYSFUNCTION OF SACRAL REGION: Primary | ICD-10-CM

## 2025-01-21 DIAGNOSIS — M54.50 LOW BACK PAIN WITHOUT SCIATICA, UNSPECIFIED BACK PAIN LATERALITY, UNSPECIFIED CHRONICITY: ICD-10-CM

## 2025-01-21 DIAGNOSIS — M99.02 SEGMENTAL DYSFUNCTION OF THORACIC REGION: ICD-10-CM

## 2025-01-21 DIAGNOSIS — M99.03 SEGMENTAL DYSFUNCTION OF LUMBAR REGION: ICD-10-CM

## 2025-01-21 PROCEDURE — 98942 CHIROPRACTIC MANJ 5 REGIONS: CPT | Performed by: CHIROPRACTOR

## 2025-01-21 RX ORDER — CLOBETASOL PROPIONATE 0.5 MG/ML
1 SOLUTION TOPICAL EVERY EVENING
COMMUNITY
Start: 2024-12-18

## 2025-01-21 NOTE — PROGRESS NOTES
Initial date of service: 1/2/25    Diagnoses and all orders for this visit:    Segmental dysfunction of sacral region    Segmental dysfunction of lumbar region    Segmental dysfunction of thoracic region    Segmental dysfunction of cervical region    Low back pain without sciatica, unspecified back pain laterality, unspecified chronicity    Myofascial pain syndrome    Other orders  -     clobetasol (TEMOVATE) 0.05 % external solution; 1 Application every evening       ASSESSMENT:  Pt's symptoms and exam findings consistent with back pain and neck pain. complicated by myofascial pain syndrome secondary to repetitive st/sp injury, exacerbated by postural/ergonomic stressors. Pt responded well to flexion biased stretches and manual mobilization of the affected spinal and myofascial tissues with increased ROM; trial of conservative tx recommended consisting of stretching, graded mobilization/manipulation of the affected spinal and myofascial jt dysfunction, postural/ergonomic education and take home stretches/exercises. If symptoms fail to improve with short trial of conservative care, appropriate imaging and referral will be coordinated.  - Tolerated treatment well with slight decrease in mm spasm, slow but gradual improvements.    PROCEDURE CODES: 68626    TREATMENT:  Fear avoidance behavior discussion; encouraged and reassured pt that natural course of condition is to improve over time with adherence to tx plan and home care strategies. Home care recommendations: avoid bed rest, walk (but avoid trails and uneven surfaces), gradual return to activity to tolerance (avoid anything that peripheralizes symptoms), call if symptoms peripheralize, worsen, or neurologic deficit progresses. Ther-ex: IASTM; discussed post procedure soreness and/or ecchymosis for up to 36 hrs, applied to affected mm hypertonicities; supine hamstring stretch, supine gluteal stretch, side laying QL stretch, single knee to chest stretch, hip flexor  pin-and-stretch, alternating prone hip extension, glute bridge, transitional mvmt education, abdominal bracing; greater than 15 min spent performing above mentioned ther-ex to improve ROM/flexibility. Cervical mobilization- PA CT junction HVLA, Cervical manual traction, Thoracic mobilization/manipulation: prone P-A mob; Lumbar mobilization/manipulation: diversified side laying graded HVLA, flexion-traction; Sacrum- Mobilization- Long axis traction, flexion-distraction, SIJ Manipulation/Mobilization: R/L SIJ HVLA - long axis distraction, byrd drop table maneuver to affected SIJ    HPI:  Zach Cain is a 24 y.o. male  Chief Complaint   Patient presents with   • Neck - Follow-up     Neck is slightly better but still sore.  Pain score 5-6       • Back Pain     Mid back is stiff, sore and tight     Pain score 5-6       The patient presents to the office with lower back and mid back pain since Middle school. The patient reports the lower back is worst. The pt is feeling a little better but then it aggravates it again. The patient went to PT in the past and it made it worse. The patient has went chiro before but only the same adjustment. Not working but did work several years ago at [employer]. Sitting more than 3 hrs aggravates back. Standing for longer then 3 hours. The patient used weights and mountain climbers, hurts after and irritates back. He also plays video games. Pain level is 8/10. Heating pad- heats for an hour, Salonpas, hot shower. He is currently working with a Nutritionist. MRI 5/3/21- [facility] Pt also mentions he is being managed by Neurologist for migraines.   1/13- Pt feels about the same today. 5-6/10 in the neck and back  1/21/25- Neck and back pain 5-6/10. Felt the best about 2 hours after treatment and then is started slowly creeping back.    Neck Pain   Associated symptoms include headaches.   Back Pain  Associated symptoms include headaches.     Past Medical History:   Diagnosis Date   • Anxiety     • Asthma    • Migraines       Past Surgical History:   Procedure Laterality Date   • CIRCUMCISION       The following portions of the patient's history were reviewed and updated as appropriate: allergies, past family history, past medical history, past social history, past surgical history, and problem list.  Review of Systems   Musculoskeletal:  Positive for back pain, myalgias, neck pain and neck stiffness.   Neurological:  Positive for headaches.     Physical Exam  Constitutional:       Appearance: Normal appearance.   Musculoskeletal:         General: Tenderness present. Normal range of motion.        Arms:       Cervical back: Normal range of motion. Rigidity and tenderness present.   Skin:     General: Skin is warm.   Neurological:      Mental Status: He is alert and oriented to person, place, and time.      Gait: Gait is intact.      Deep Tendon Reflexes: Reflexes are normal and symmetric.   Psychiatric:         Attention and Perception: Attention normal.         Mood and Affect: Affect normal.         Speech: Speech normal.         Behavior: Behavior is cooperative.         Cognition and Memory: Cognition normal.       SOFT TISSUE ASSESSMENT Hypertonicity and tenderness palpated B C5-T7, Upper trap, lev scap, Sub Occ, SCM, Middle trap, T10-S1 erector spinae, hip flexor, glute med/min, QL, hamstring JOINT RESTRICTIONS: C6-T5, T10-S1 and R SIJ ORTHO: SI jt point tenderness: +; Kelsea unremarkable for centralization/peripheralization; alejandra's, iliac compression, thigh thrust elicit lbp in R/L SIJ; prone femoral nerve stretch neg for upper lumbar neural tension, elicits R SIJ stiffness; sitting root elicits no lbp on R/L; slump test elicits no neural tension R/L, Spurling- neg, Distraction- neg, Max For compr- Neg    Return in about 1 week (around 1/28/2025) for Recheck.

## 2025-01-28 ENCOUNTER — PROCEDURE VISIT (OUTPATIENT)
Age: 25
End: 2025-01-28
Payer: COMMERCIAL

## 2025-01-28 VITALS
WEIGHT: 172 LBS | SYSTOLIC BLOOD PRESSURE: 125 MMHG | HEIGHT: 71 IN | BODY MASS INDEX: 24.08 KG/M2 | DIASTOLIC BLOOD PRESSURE: 78 MMHG | HEART RATE: 76 BPM

## 2025-01-28 DIAGNOSIS — M99.02 SEGMENTAL DYSFUNCTION OF THORACIC REGION: ICD-10-CM

## 2025-01-28 DIAGNOSIS — M54.50 LOW BACK PAIN WITHOUT SCIATICA, UNSPECIFIED BACK PAIN LATERALITY, UNSPECIFIED CHRONICITY: ICD-10-CM

## 2025-01-28 DIAGNOSIS — M99.01 SEGMENTAL DYSFUNCTION OF CERVICAL REGION: ICD-10-CM

## 2025-01-28 DIAGNOSIS — M99.04 SEGMENTAL DYSFUNCTION OF SACRAL REGION: Primary | ICD-10-CM

## 2025-01-28 DIAGNOSIS — M99.03 SEGMENTAL DYSFUNCTION OF LUMBAR REGION: ICD-10-CM

## 2025-01-28 DIAGNOSIS — M79.18 MYOFASCIAL PAIN SYNDROME: ICD-10-CM

## 2025-01-28 PROCEDURE — 98942 CHIROPRACTIC MANJ 5 REGIONS: CPT | Performed by: CHIROPRACTOR

## 2025-01-28 NOTE — PROGRESS NOTES
Initial date of service: 1/2/25    Diagnoses and all orders for this visit:    Segmental dysfunction of sacral region    Segmental dysfunction of lumbar region    Segmental dysfunction of thoracic region    Segmental dysfunction of cervical region    Low back pain without sciatica, unspecified back pain laterality, unspecified chronicity    Myofascial pain syndrome       ASSESSMENT:  Pt's symptoms and exam findings consistent with back pain and neck pain. complicated by myofascial pain syndrome secondary to repetitive st/sp injury, exacerbated by postural/ergonomic stressors. Pt responded well to flexion biased stretches and manual mobilization of the affected spinal and myofascial tissues with increased ROM; trial of conservative tx recommended consisting of stretching, graded mobilization/manipulation of the affected spinal and myofascial jt dysfunction, postural/ergonomic education and take home stretches/exercises. If symptoms fail to improve with short trial of conservative care, appropriate imaging and referral will be coordinated.  - Tolerated treatment well with slight decrease in mm spasm, slow but gradual improvements.    PROCEDURE CODES: 43419    TREATMENT:  Fear avoidance behavior discussion; encouraged and reassured pt that natural course of condition is to improve over time with adherence to tx plan and home care strategies. Home care recommendations: avoid bed rest, walk (but avoid trails and uneven surfaces), gradual return to activity to tolerance (avoid anything that peripheralizes symptoms), call if symptoms peripheralize, worsen, or neurologic deficit progresses. Ther-ex: IASTM; discussed post procedure soreness and/or ecchymosis for up to 36 hrs, applied to affected mm hypertonicities; supine hamstring stretch, supine gluteal stretch, side laying QL stretch, single knee to chest stretch, hip flexor pin-and-stretch, alternating prone hip extension, glute bridge, transitional mvmt education,  abdominal bracing; greater than 15 min spent performing above mentioned ther-ex to improve ROM/flexibility. Cervical mobilization- PA CT junction HVLA, Cervical manual traction, Thoracic mobilization/manipulation: prone P-A mob; Lumbar mobilization/manipulation: diversified side laying graded HVLA, flexion-traction; Sacrum- Mobilization- Long axis traction, flexion-distraction, SIJ Manipulation/Mobilization: R/L SIJ HVLA - long axis distraction, byrd drop table maneuver to affected SIJ    HPI:  Zach Cain is a 24 y.o. male  Chief Complaint   Patient presents with   • Neck - Pain     Neck pain score 4    Patient states stiff and achy pain    • Back Pain     Mid back is stiff and achy  Pain score 4        The patient presents to the office with lower back and mid back pain since Middle school. The patient reports the lower back is worst. The pt is feeling a little better but then it aggravates it again. The patient went to PT in the past and it made it worse. The patient has went chiro before but only the same adjustment. Not working but did work several years ago at [employer]. Sitting more than 3 hrs aggravates back. Standing for longer then 3 hours. The patient used weights and mountain climbers, hurts after and irritates back. He also plays video games. Pain level is 8/10. Heating pad- heats for an hour, Salonpas, hot shower. He is currently working with a Nutritionist. MRI 5/3/21- WNL Pt also mentions he is being managed by Neurologist for migraines.   1/13- Pt feels about the same today. 5-6/10 in the neck and back  1/21/25- Neck and back pain 5-6/10. Felt the best about 2 hours after treatment and then is started slowly creeping back.  1/28- The patient tolerated treatment last visit fairly well, he went to go to the gym, 4/10 neck and back    Neck Pain   Associated symptoms include headaches.   Back Pain  Associated symptoms include headaches.     Past Medical History:   Diagnosis Date   • Anxiety    •  Asthma    • Migraines       Past Surgical History:   Procedure Laterality Date   • CIRCUMCISION       The following portions of the patient's history were reviewed and updated as appropriate: allergies, past family history, past medical history, past social history, past surgical history, and problem list.  Review of Systems   Musculoskeletal:  Positive for back pain, myalgias, neck pain and neck stiffness.   Neurological:  Positive for headaches.     Physical Exam  Constitutional:       Appearance: Normal appearance.   Musculoskeletal:         General: Tenderness present. Normal range of motion.        Arms:       Cervical back: Normal range of motion. Rigidity and tenderness present.   Skin:     General: Skin is warm.   Neurological:      Mental Status: He is alert and oriented to person, place, and time.      Gait: Gait is intact.      Deep Tendon Reflexes: Reflexes are normal and symmetric.   Psychiatric:         Attention and Perception: Attention normal.         Mood and Affect: Affect normal.         Speech: Speech normal.         Behavior: Behavior is cooperative.         Cognition and Memory: Cognition normal.     SOFT TISSUE ASSESSMENT Hypertonicity and tenderness palpated B C5-T7, Upper trap, lev scap, Sub Occ, SCM, Middle trap, T10-S1 erector spinae, hip flexor, glute med/min, QL, hamstring JOINT RESTRICTIONS: C6-T5, T10-S1 and R SIJ ORTHO: SI jt point tenderness: +; Kelsea unremarkable for centralization/peripheralization; alejandra's, iliac compression, thigh thrust elicit lbp in R/L SIJ; prone femoral nerve stretch neg for upper lumbar neural tension, elicits R SIJ stiffness; sitting root elicits no lbp on R/L; slump test elicits no neural tension R/L, Spurling- neg, Distraction- neg, Max For compr- Neg    Return in about 1 week (around 2/4/2025) for Recheck.

## 2025-02-04 ENCOUNTER — PROCEDURE VISIT (OUTPATIENT)
Age: 25
End: 2025-02-04
Payer: COMMERCIAL

## 2025-02-04 VITALS
HEIGHT: 71 IN | SYSTOLIC BLOOD PRESSURE: 112 MMHG | BODY MASS INDEX: 24.08 KG/M2 | HEART RATE: 74 BPM | DIASTOLIC BLOOD PRESSURE: 63 MMHG | WEIGHT: 172 LBS

## 2025-02-04 DIAGNOSIS — M99.04 SEGMENTAL DYSFUNCTION OF SACRAL REGION: Primary | ICD-10-CM

## 2025-02-04 DIAGNOSIS — M99.03 SEGMENTAL DYSFUNCTION OF LUMBAR REGION: ICD-10-CM

## 2025-02-04 DIAGNOSIS — M79.18 MYOFASCIAL PAIN SYNDROME: ICD-10-CM

## 2025-02-04 DIAGNOSIS — M99.02 SEGMENTAL DYSFUNCTION OF THORACIC REGION: ICD-10-CM

## 2025-02-04 DIAGNOSIS — M54.50 LOW BACK PAIN WITHOUT SCIATICA, UNSPECIFIED BACK PAIN LATERALITY, UNSPECIFIED CHRONICITY: ICD-10-CM

## 2025-02-04 DIAGNOSIS — M99.01 SEGMENTAL DYSFUNCTION OF CERVICAL REGION: ICD-10-CM

## 2025-02-04 PROCEDURE — 98942 CHIROPRACTIC MANJ 5 REGIONS: CPT | Performed by: CHIROPRACTOR

## 2025-02-04 NOTE — PROGRESS NOTES
Initial date of service: 1/2/25    Diagnoses and all orders for this visit:    Segmental dysfunction of sacral region    Segmental dysfunction of lumbar region    Segmental dysfunction of thoracic region    Segmental dysfunction of cervical region    Low back pain without sciatica, unspecified back pain laterality, unspecified chronicity    Myofascial pain syndrome       ASSESSMENT:  Pt's symptoms and exam findings consistent with back pain and neck pain. complicated by myofascial pain syndrome secondary to repetitive st/sp injury, exacerbated by postural/ergonomic stressors. Pt responded well to flexion biased stretches and manual mobilization of the affected spinal and myofascial tissues with increased ROM; trial of conservative tx recommended consisting of stretching, graded mobilization/manipulation of the affected spinal and myofascial jt dysfunction, postural/ergonomic education and take home stretches/exercises. If symptoms fail to improve with short trial of conservative care, appropriate imaging and referral will be coordinated.  - Tolerated treatment well with slight decrease in mm spasm, slow but gradual improvements.    PROCEDURE CODES: 42548    TREATMENT:  Fear avoidance behavior discussion; encouraged and reassured pt that natural course of condition is to improve over time with adherence to tx plan and home care strategies. Home care recommendations: avoid bed rest, walk (but avoid trails and uneven surfaces), gradual return to activity to tolerance (avoid anything that peripheralizes symptoms), call if symptoms peripheralize, worsen, or neurologic deficit progresses. Ther-ex: IASTM; discussed post procedure soreness and/or ecchymosis for up to 36 hrs, applied to affected mm hypertonicities; supine hamstring stretch, supine gluteal stretch, side laying QL stretch, single knee to chest stretch, hip flexor pin-and-stretch, alternating prone hip extension, glute bridge, transitional mvmt education,  abdominal bracing; greater than 15 min spent performing above mentioned ther-ex to improve ROM/flexibility. Cervical mobilization- PA CT junction HVLA, Cervical manual traction, Thoracic mobilization/manipulation: prone P-A mob; Lumbar mobilization/manipulation: diversified side laying graded HVLA, flexion-traction; Sacrum- Mobilization- Long axis traction, flexion-distraction, SIJ Manipulation/Mobilization: R/L SIJ HVLA - long axis distraction, byrd drop table maneuver to affected SIJ    HPI:  Zach Cain is a 24 y.o. male  Chief Complaint   Patient presents with   • Neck - Follow-up     Neck pain score 7   Patient states very sore      • Back Pain     Lower lumbar pain score 7      Patient states was moving and lifting objects        The patient presents to the office with lower back and mid back pain since Middle school. The patient reports the lower back is worst. The pt is feeling a little better but then it aggravates it again. The patient went to PT in the past and it made it worse. The patient has went chiro before but only the same adjustment. Not working but did work several years ago at [employer]. Sitting more than 3 hrs aggravates back. Standing for longer then 3 hours. The patient used weights and mountain climbers, hurts after and irritates back. He also plays video games. Pain level is 8/10. Heating pad- heats for an hour, Salonpas, hot shower. He is currently working with a Nutritionist. MRI 5/3/21- WNL Pt also mentions he is being managed by Neurologist for migraines.   1/13- Pt feels about the same today. 5-6/10 in the neck and back  1/21/25- Neck and back pain 5-6/10. Felt the best about 2 hours after treatment and then is started slowly creeping back.  1/28- The patient tolerated treatment last visit fairly well, he went to go to the gym, 4/10 neck and back  2/4/25- The pt reports improvements after last visit but then moved a futon.     Neck Pain   Associated symptoms include headaches.    Back Pain  Associated symptoms include headaches.     Past Medical History:   Diagnosis Date   • Anxiety    • Asthma    • Migraines       Past Surgical History:   Procedure Laterality Date   • CIRCUMCISION       The following portions of the patient's history were reviewed and updated as appropriate: allergies, past family history, past medical history, past social history, past surgical history, and problem list.  Review of Systems   Musculoskeletal:  Positive for back pain, myalgias, neck pain and neck stiffness.   Neurological:  Positive for headaches.     Physical Exam  Constitutional:       Appearance: Normal appearance.   Musculoskeletal:         General: Tenderness present. Normal range of motion.        Arms:       Cervical back: Normal range of motion. Rigidity and tenderness present.   Skin:     General: Skin is warm.   Neurological:      Mental Status: He is alert and oriented to person, place, and time.      Gait: Gait is intact.      Deep Tendon Reflexes: Reflexes are normal and symmetric.   Psychiatric:         Attention and Perception: Attention normal.         Mood and Affect: Affect normal.         Speech: Speech normal.         Behavior: Behavior is cooperative.         Cognition and Memory: Cognition normal.       SOFT TISSUE ASSESSMENT Hypertonicity and tenderness palpated B C5-T7, Upper trap, lev scap, Sub Occ, SCM, Middle trap, T10-S1 erector spinae, hip flexor, glute med/min, QL, hamstring JOINT RESTRICTIONS: C6-T5, T10-S1 and R SIJ ORTHO: SI jt point tenderness: +; Kelsea unremarkable for centralization/peripheralization; alejandra's, iliac compression, thigh thrust elicit lbp in R/L SIJ; prone femoral nerve stretch neg for upper lumbar neural tension, elicits R SIJ stiffness; sitting root elicits no lbp on R/L; slump test elicits no neural tension R/L, Spurling- neg, Distraction- neg, Max For compr- Neg    Return in about 1 week (around 2/11/2025) for Recheck.

## 2025-02-18 ENCOUNTER — PROCEDURE VISIT (OUTPATIENT)
Age: 25
End: 2025-02-18
Payer: COMMERCIAL

## 2025-02-18 VITALS
SYSTOLIC BLOOD PRESSURE: 132 MMHG | HEIGHT: 71 IN | WEIGHT: 172 LBS | BODY MASS INDEX: 24.08 KG/M2 | DIASTOLIC BLOOD PRESSURE: 87 MMHG | HEART RATE: 87 BPM

## 2025-02-18 DIAGNOSIS — M99.01 SEGMENTAL DYSFUNCTION OF CERVICAL REGION: ICD-10-CM

## 2025-02-18 DIAGNOSIS — M99.04 SEGMENTAL DYSFUNCTION OF SACRAL REGION: Primary | ICD-10-CM

## 2025-02-18 DIAGNOSIS — M79.18 MYOFASCIAL PAIN SYNDROME: ICD-10-CM

## 2025-02-18 DIAGNOSIS — M54.50 LOW BACK PAIN WITHOUT SCIATICA, UNSPECIFIED BACK PAIN LATERALITY, UNSPECIFIED CHRONICITY: ICD-10-CM

## 2025-02-18 DIAGNOSIS — M99.03 SEGMENTAL DYSFUNCTION OF LUMBAR REGION: ICD-10-CM

## 2025-02-18 DIAGNOSIS — M99.02 SEGMENTAL DYSFUNCTION OF THORACIC REGION: ICD-10-CM

## 2025-02-18 PROCEDURE — 98942 CHIROPRACTIC MANJ 5 REGIONS: CPT | Performed by: CHIROPRACTOR

## 2025-02-21 NOTE — PROGRESS NOTES
Initial date of service: 1/2/25    Diagnoses and all orders for this visit:    Segmental dysfunction of sacral region    Segmental dysfunction of lumbar region    Segmental dysfunction of thoracic region    Segmental dysfunction of cervical region    Low back pain without sciatica, unspecified back pain laterality, unspecified chronicity    Myofascial pain syndrome       ASSESSMENT:  Pt's symptoms and exam findings consistent with back pain and neck pain. complicated by myofascial pain syndrome secondary to repetitive st/sp injury, exacerbated by postural/ergonomic stressors. Pt responded well to flexion biased stretches and manual mobilization of the affected spinal and myofascial tissues with increased ROM; trial of conservative tx recommended consisting of stretching, graded mobilization/manipulation of the affected spinal and myofascial jt dysfunction, postural/ergonomic education and take home stretches/exercises. If symptoms fail to improve with short trial of conservative care, appropriate imaging and referral will be coordinated.  - Tolerated treatment well with slight decrease in mm spasm, slow but gradual improvements.    PROCEDURE CODES: 55045    TREATMENT:  Fear avoidance behavior discussion; encouraged and reassured pt that natural course of condition is to improve over time with adherence to tx plan and home care strategies. Home care recommendations: avoid bed rest, walk (but avoid trails and uneven surfaces), gradual return to activity to tolerance (avoid anything that peripheralizes symptoms), call if symptoms peripheralize, worsen, or neurologic deficit progresses. Ther-ex: IASTM; discussed post procedure soreness and/or ecchymosis for up to 36 hrs, applied to affected mm hypertonicities; supine hamstring stretch, supine gluteal stretch, side laying QL stretch, single knee to chest stretch, hip flexor pin-and-stretch, alternating prone hip extension, glute bridge, transitional mvmt education,  abdominal bracing; greater than 15 min spent performing above mentioned ther-ex to improve ROM/flexibility. Cervical mobilization- PA CT junction HVLA, Cervical manual traction, Thoracic mobilization/manipulation: prone P-A mob; Lumbar mobilization/manipulation: diversified side laying graded HVLA, flexion-traction; Sacrum- Mobilization- Long axis traction, flexion-distraction, SIJ Manipulation/Mobilization: R/L SIJ HVLA - long axis distraction, byrd drop table maneuver to affected SIJ    HPI:  Zach Cain is a 24 y.o. male  Chief Complaint   Patient presents with   • Neck - Follow-up     Neck is tight and sore  Pain score 6-7   • Back Pain     Lower lumbar having sharp pains. Pain score 6-7         The patient presents to the office with lower back and mid back pain since Middle school. The patient reports the lower back is worst. The pt is feeling a little better but then it aggravates it again. The patient went to PT in the past and it made it worse. The patient has went chiro before but only the same adjustment. Not working but did work several years ago at [employer]. Sitting more than 3 hrs aggravates back. Standing for longer then 3 hours. The patient used weights and mountain climbers, hurts after and irritates back. He also plays video games. Pain level is 8/10. Heating pad- heats for an hour, Salonpas, hot shower. He is currently working with a Nutritionist. MRI 5/3/21- WNL Pt also mentions he is being managed by Neurologist for migraines.   1/13- Pt feels about the same today. 5-6/10 in the neck and back  1/21/25- Neck and back pain 5-6/10. Felt the best about 2 hours after treatment and then is started slowly creeping back.  1/28- The patient tolerated treatment last visit fairly well, he went to go to the gym, 4/10 neck and back  2/4/25- The pt reports improvements after last visit but then moved a futon.   - The patient is feeling a little better after last visit but he is sore today, neck and  lower back pain will be 6-7/10.    Neck Pain   Associated symptoms include headaches.   Back Pain  Associated symptoms include headaches.     Past Medical History:   Diagnosis Date   • Anxiety    • Asthma    • Migraines       Past Surgical History:   Procedure Laterality Date   • CIRCUMCISION       The following portions of the patient's history were reviewed and updated as appropriate: allergies, past family history, past medical history, past social history, past surgical history, and problem list.  Review of Systems   Musculoskeletal:  Positive for back pain, myalgias, neck pain and neck stiffness.   Neurological:  Positive for headaches.     Physical Exam  Constitutional:       Appearance: Normal appearance.   Musculoskeletal:         General: Tenderness present. Normal range of motion.        Arms:       Cervical back: Normal range of motion. Rigidity and tenderness present.   Skin:     General: Skin is warm.   Neurological:      Mental Status: He is alert and oriented to person, place, and time.      Gait: Gait is intact.      Deep Tendon Reflexes: Reflexes are normal and symmetric.   Psychiatric:         Attention and Perception: Attention normal.         Mood and Affect: Affect normal.         Speech: Speech normal.         Behavior: Behavior is cooperative.         Cognition and Memory: Cognition normal.       SOFT TISSUE ASSESSMENT Hypertonicity and tenderness palpated B C5-T7, Upper trap, lev scap, Sub Occ, SCM, Middle trap, T10-S1 erector spinae, hip flexor, glute med/min, QL, hamstring JOINT RESTRICTIONS: C6-T5, T10-S1 and R SIJ ORTHO: SI jt point tenderness: +; Kelsea unremarkable for centralization/peripheralization; alejandra's, iliac compression, thigh thrust elicit lbp in R/L SIJ; prone femoral nerve stretch neg for upper lumbar neural tension, elicits R SIJ stiffness; sitting root elicits no lbp on R/L; slump test elicits no neural tension R/L, Spurling- neg, Distraction- neg, Max For compr-  Neg    Return in about 1 week (around 2/25/2025) for Recheck.

## 2025-03-04 ENCOUNTER — PROCEDURE VISIT (OUTPATIENT)
Age: 25
End: 2025-03-04
Payer: COMMERCIAL

## 2025-03-04 VITALS
HEART RATE: 71 BPM | BODY MASS INDEX: 24.08 KG/M2 | DIASTOLIC BLOOD PRESSURE: 69 MMHG | WEIGHT: 172 LBS | SYSTOLIC BLOOD PRESSURE: 121 MMHG | HEIGHT: 71 IN

## 2025-03-04 DIAGNOSIS — M54.50 LOW BACK PAIN WITHOUT SCIATICA, UNSPECIFIED BACK PAIN LATERALITY, UNSPECIFIED CHRONICITY: ICD-10-CM

## 2025-03-04 DIAGNOSIS — M99.03 SEGMENTAL DYSFUNCTION OF LUMBAR REGION: ICD-10-CM

## 2025-03-04 DIAGNOSIS — M99.04 SEGMENTAL DYSFUNCTION OF SACRAL REGION: Primary | ICD-10-CM

## 2025-03-04 DIAGNOSIS — M99.01 SEGMENTAL DYSFUNCTION OF CERVICAL REGION: ICD-10-CM

## 2025-03-04 DIAGNOSIS — M99.02 SEGMENTAL DYSFUNCTION OF THORACIC REGION: ICD-10-CM

## 2025-03-04 DIAGNOSIS — M79.18 MYOFASCIAL PAIN SYNDROME: ICD-10-CM

## 2025-03-04 PROCEDURE — 98941 CHIROPRACT MANJ 3-4 REGIONS: CPT | Performed by: CHIROPRACTOR

## 2025-03-04 NOTE — PROGRESS NOTES
Initial date of service: 1/2/25    Diagnoses and all orders for this visit:    Segmental dysfunction of sacral region    Segmental dysfunction of lumbar region    Segmental dysfunction of thoracic region    Segmental dysfunction of cervical region    Low back pain without sciatica, unspecified back pain laterality, unspecified chronicity    Myofascial pain syndrome       ASSESSMENT:  Pt's symptoms and exam findings consistent with back pain and neck pain. complicated by myofascial pain syndrome secondary to repetitive st/sp injury, exacerbated by postural/ergonomic stressors. Pt responded well to flexion biased stretches and manual mobilization of the affected spinal and myofascial tissues with increased ROM; trial of conservative tx recommended consisting of stretching, graded mobilization/manipulation of the affected spinal and myofascial jt dysfunction, postural/ergonomic education and take home stretches/exercises. If symptoms fail to improve with short trial of conservative care, appropriate imaging and referral will be coordinated.  - Tolerated treatment well with slight decrease in mm spasm, slow but gradual improvements.    PROCEDURE CODES: 54663    TREATMENT:  Fear avoidance behavior discussion; encouraged and reassured pt that natural course of condition is to improve over time with adherence to tx plan and home care strategies. Home care recommendations: avoid bed rest, walk (but avoid trails and uneven surfaces), gradual return to activity to tolerance (avoid anything that peripheralizes symptoms), call if symptoms peripheralize, worsen, or neurologic deficit progresses. Ther-ex: IASTM; discussed post procedure soreness and/or ecchymosis for up to 36 hrs, applied to affected mm hypertonicities; supine hamstring stretch, supine gluteal stretch, side laying QL stretch, single knee to chest stretch, hip flexor pin-and-stretch, alternating prone hip extension, glute bridge, transitional mvmt education,  abdominal bracing; greater than 15 min spent performing above mentioned ther-ex to improve ROM/flexibility. Cervical mobilization- PA CT junction HVLA, Cervical manual traction, Thoracic mobilization/manipulation: prone P-A mob; Lumbar mobilization/manipulation: diversified side laying graded HVLA, flexion-traction; Sacrum- Mobilization- Long axis traction, flexion-distraction, SIJ Manipulation/Mobilization: R/L SIJ HVLA - long axis distraction, byrd drop table maneuver to affected SIJ    HPI:  Zach Cain is a 24 y.o. male  Chief Complaint   Patient presents with   • Neck - Follow-up     Neck pain score 5    Patient states achy pain    • Back Pain     Lower lumbar pain score 5     Patient states  achy pain      The patient presents to the office with lower back and mid back pain since Middle school. The patient reports the lower back is worst. The pt is feeling a little better but then it aggravates it again. The patient went to PT in the past and it made it worse. The patient has went chiro before but only the same adjustment. Not working but did work several years ago at [employer]. Sitting more than 3 hrs aggravates back. Standing for longer then 3 hours. The patient used weights and mountain climbers, hurts after and irritates back. He also plays video games. Pain level is 8/10. Heating pad- heats for an hour, Salonpas, hot shower. He is currently working with a Nutritionist. MRI 5/3/21- WNL Pt also mentions he is being managed by Neurologist for migraines.   1/13- Pt feels about the same today. 5-6/10 in the neck and back  1/21/25- Neck and back pain 5-6/10. Felt the best about 2 hours after treatment and then is started slowly creeping back.  1/28- The patient tolerated treatment last visit fairly well, he went to go to the gym, 4/10 neck and back  2/4/25- The pt reports improvements after last visit but then moved a futon.   - The patient is feeling a little better after last visit but he is sore today,  neck and lower back pain will be 6-7/10.  3/4/25- The patient is feeling 5/10 today in the neck and lower back. More of a discomfort and achy feeling.     Neck Pain   Associated symptoms include headaches.   Back Pain  Associated symptoms include headaches.     Past Medical History:   Diagnosis Date   • Anxiety    • Asthma    • Migraines       Past Surgical History:   Procedure Laterality Date   • CIRCUMCISION       The following portions of the patient's history were reviewed and updated as appropriate: allergies, past family history, past medical history, past social history, past surgical history, and problem list.  Review of Systems   Musculoskeletal:  Positive for back pain, myalgias, neck pain and neck stiffness.   Neurological:  Positive for headaches.     Physical Exam  Constitutional:       Appearance: Normal appearance.   Musculoskeletal:         General: Tenderness present. Normal range of motion.        Arms:       Cervical back: Normal range of motion. Rigidity and tenderness present.   Skin:     General: Skin is warm.   Neurological:      Mental Status: He is alert and oriented to person, place, and time.      Gait: Gait is intact.      Deep Tendon Reflexes: Reflexes are normal and symmetric.   Psychiatric:         Attention and Perception: Attention normal.         Mood and Affect: Affect normal.         Speech: Speech normal.         Behavior: Behavior is cooperative.         Cognition and Memory: Cognition normal.       SOFT TISSUE ASSESSMENT Hypertonicity and tenderness palpated B C5-T7, Upper trap, lev scap, Sub Occ, SCM, Middle trap, T10-S1 erector spinae, hip flexor, glute med/min, QL, hamstring JOINT RESTRICTIONS: C6-T5, T10-S1 and R SIJ ORTHO: SI jt point tenderness: +; Kelsea unremarkable for centralization/peripheralization; alejandra's, iliac compression, thigh thrust elicit lbp in R/L SIJ; prone femoral nerve stretch neg for upper lumbar neural tension, elicits R SIJ stiffness; sitting  root elicits no lbp on R/L; slump test elicits no neural tension R/L, Spurling- neg, Distraction- neg, Max For compr- Neg    Return in about 1 week (around 3/11/2025) for Recheck.

## 2025-03-25 ENCOUNTER — PROCEDURE VISIT (OUTPATIENT)
Age: 25
End: 2025-03-25
Payer: COMMERCIAL

## 2025-03-25 VITALS
DIASTOLIC BLOOD PRESSURE: 73 MMHG | SYSTOLIC BLOOD PRESSURE: 115 MMHG | BODY MASS INDEX: 24.08 KG/M2 | WEIGHT: 172 LBS | HEIGHT: 71 IN | HEART RATE: 74 BPM

## 2025-03-25 DIAGNOSIS — M99.02 SEGMENTAL DYSFUNCTION OF THORACIC REGION: ICD-10-CM

## 2025-03-25 DIAGNOSIS — M99.03 SEGMENTAL DYSFUNCTION OF LUMBAR REGION: ICD-10-CM

## 2025-03-25 DIAGNOSIS — M99.04 SEGMENTAL DYSFUNCTION OF SACRAL REGION: Primary | ICD-10-CM

## 2025-03-25 DIAGNOSIS — M54.50 LOW BACK PAIN WITHOUT SCIATICA, UNSPECIFIED BACK PAIN LATERALITY, UNSPECIFIED CHRONICITY: ICD-10-CM

## 2025-03-25 DIAGNOSIS — M99.01 SEGMENTAL DYSFUNCTION OF CERVICAL REGION: ICD-10-CM

## 2025-03-25 DIAGNOSIS — M79.18 MYOFASCIAL PAIN SYNDROME: ICD-10-CM

## 2025-03-25 PROCEDURE — 98941 CHIROPRACT MANJ 3-4 REGIONS: CPT | Performed by: CHIROPRACTOR

## 2025-03-25 RX ORDER — TRETINOIN 0.025 %
1 CREAM (GRAM) TOPICAL
COMMUNITY
Start: 2025-03-25

## 2025-03-25 NOTE — PROGRESS NOTES
Initial date of service: 1/2/25    Diagnoses and all orders for this visit:    Segmental dysfunction of sacral region    Segmental dysfunction of lumbar region    Segmental dysfunction of thoracic region    Segmental dysfunction of cervical region    Low back pain without sciatica, unspecified back pain laterality, unspecified chronicity    Myofascial pain syndrome    Other orders  -     Retin-A 0.025 % cream; Apply 1 Application topically daily at bedtime       ASSESSMENT:  Pt's symptoms and exam findings consistent with back pain and neck pain. complicated by myofascial pain syndrome secondary to repetitive st/sp injury, exacerbated by postural/ergonomic stressors. Pt responded well to flexion biased stretches and manual mobilization of the affected spinal and myofascial tissues with increased ROM; trial of conservative tx recommended consisting of stretching, graded mobilization/manipulation of the affected spinal and myofascial jt dysfunction, postural/ergonomic education and take home stretches/exercises. If symptoms fail to improve with short trial of conservative care, appropriate imaging and referral will be coordinated.  - Tolerated treatment well with slight decrease in mm spasm, slow but gradual improvements.    PROCEDURE CODES: 69878    TREATMENT:  Fear avoidance behavior discussion; encouraged and reassured pt that natural course of condition is to improve over time with adherence to tx plan and home care strategies. Home care recommendations: avoid bed rest, walk (but avoid trails and uneven surfaces), gradual return to activity to tolerance (avoid anything that peripheralizes symptoms), call if symptoms peripheralize, worsen, or neurologic deficit progresses. Ther-ex: IASTM; discussed post procedure soreness and/or ecchymosis for up to 36 hrs, applied to affected mm hypertonicities; supine hamstring stretch, supine gluteal stretch, side laying QL stretch, single knee to chest stretch, hip flexor  pin-and-stretch, alternating prone hip extension, glute bridge, transitional mvmt education, abdominal bracing; greater than 15 min spent performing above mentioned ther-ex to improve ROM/flexibility. Cervical mobilization- PA CT junction HVLA, Cervical manual traction, Thoracic mobilization/manipulation: prone P-A mob; Lumbar mobilization/manipulation: diversified side laying graded HVLA, flexion-traction; Sacrum- Mobilization- Long axis traction, flexion-distraction, SIJ Manipulation/Mobilization: R/L SIJ HVLA - long axis distraction, byrd drop table maneuver to affected SIJ    HPI:  Zach Cani is a 24 y.o. male  Chief Complaint   Patient presents with   • Neck - Follow-up     Neck is stiff and achy pain . Pain score 5      • Back Pain     Lower lumbar pain score 5     Patient states achy      The patient presents to the office with lower back and mid back pain since Middle school. The patient reports the lower back is worst. The pt is feeling a little better but then it aggravates it again. The patient went to PT in the past and it made it worse. The patient has went chiro before but only the same adjustment. Not working but did work several years ago at [employer]. Sitting more than 3 hrs aggravates back. Standing for longer then 3 hours. The patient used weights and mountain climbers, hurts after and irritates back. He also plays video games. Pain level is 8/10. Heating pad- heats for an hour, Salonpas, hot shower. He is currently working with a Nutritionist. MRI 5/3/21- WNL Pt also mentions he is being managed by Neurologist for migraines.   1/13- Pt feels about the same today. 5-6/10 in the neck and back  1/21/25- Neck and back pain 5-6/10. Felt the best about 2 hours after treatment and then is started slowly creeping back.  1/28- The patient tolerated treatment last visit fairly well, he went to go to the gym, 4/10 neck and back  2/4/25- The pt reports improvements after last visit but then moved a  ihsan.   - The patient is feeling a little better after last visit but he is sore today, neck and lower back pain will be 6-7/10.  3/4/25- The patient is feeling 5/10 today in the neck and lower back. More of a discomfort and achy feeling.   3/25/25- The patient is feeling sore today 5/10 pain in the neck and back.     Neck Pain   Associated symptoms include headaches.   Back Pain  Associated symptoms include headaches.     Past Medical History:   Diagnosis Date   • Anxiety    • Asthma    • Migraines       Past Surgical History:   Procedure Laterality Date   • CIRCUMCISION       The following portions of the patient's history were reviewed and updated as appropriate: allergies, past family history, past medical history, past social history, past surgical history, and problem list.  Review of Systems   Musculoskeletal:  Positive for back pain, myalgias, neck pain and neck stiffness.   Neurological:  Positive for headaches.     Physical Exam  Constitutional:       Appearance: Normal appearance.   Musculoskeletal:         General: Tenderness present. Normal range of motion.        Arms:       Cervical back: Normal range of motion. Rigidity and tenderness present.   Skin:     General: Skin is warm.   Neurological:      Mental Status: He is alert and oriented to person, place, and time.      Gait: Gait is intact.      Deep Tendon Reflexes: Reflexes are normal and symmetric.   Psychiatric:         Attention and Perception: Attention normal.         Mood and Affect: Affect normal.         Speech: Speech normal.         Behavior: Behavior is cooperative.         Cognition and Memory: Cognition normal.       SOFT TISSUE ASSESSMENT Hypertonicity and tenderness palpated B C5-T7, Upper trap, lev scap, Sub Occ, SCM, Middle trap, T10-S1 erector spinae, hip flexor, glute med/min, QL, hamstring JOINT RESTRICTIONS: C6-T5, T10-S1 and R SIJ ORTHO: SI jt point tenderness: +; Kelsea unremarkable for centralization/peripheralization;  alejandra's, iliac compression, thigh thrust elicit lbp in R/L SIJ; prone femoral nerve stretch neg for upper lumbar neural tension, elicits R SIJ stiffness; sitting root elicits no lbp on R/L; slump test elicits no neural tension R/L, Spurling- neg, Distraction- neg, Max For compr- Neg    Return in about 1 week (around 4/1/2025) for Recheck.

## 2025-04-01 ENCOUNTER — PROCEDURE VISIT (OUTPATIENT)
Age: 25
End: 2025-04-01
Payer: COMMERCIAL

## 2025-04-01 VITALS
HEART RATE: 72 BPM | WEIGHT: 172 LBS | SYSTOLIC BLOOD PRESSURE: 119 MMHG | HEIGHT: 71 IN | DIASTOLIC BLOOD PRESSURE: 67 MMHG | BODY MASS INDEX: 24.08 KG/M2

## 2025-04-01 DIAGNOSIS — M99.02 SEGMENTAL DYSFUNCTION OF THORACIC REGION: ICD-10-CM

## 2025-04-01 DIAGNOSIS — M99.04 SEGMENTAL DYSFUNCTION OF SACRAL REGION: Primary | ICD-10-CM

## 2025-04-01 DIAGNOSIS — M54.50 LOW BACK PAIN WITHOUT SCIATICA, UNSPECIFIED BACK PAIN LATERALITY, UNSPECIFIED CHRONICITY: ICD-10-CM

## 2025-04-01 DIAGNOSIS — M79.18 MYOFASCIAL PAIN SYNDROME: ICD-10-CM

## 2025-04-01 DIAGNOSIS — M99.01 SEGMENTAL DYSFUNCTION OF CERVICAL REGION: ICD-10-CM

## 2025-04-01 DIAGNOSIS — M99.03 SEGMENTAL DYSFUNCTION OF LUMBAR REGION: ICD-10-CM

## 2025-04-01 PROCEDURE — 98941 CHIROPRACT MANJ 3-4 REGIONS: CPT | Performed by: CHIROPRACTOR

## 2025-04-01 NOTE — PROGRESS NOTES
Initial date of service: 1/2/25    Diagnoses and all orders for this visit:    Segmental dysfunction of sacral region    Segmental dysfunction of lumbar region    Segmental dysfunction of thoracic region    Segmental dysfunction of cervical region    Low back pain without sciatica, unspecified back pain laterality, unspecified chronicity    Myofascial pain syndrome       ASSESSMENT:  Pt's symptoms and exam findings consistent with back pain and neck pain. complicated by myofascial pain syndrome secondary to repetitive st/sp injury, exacerbated by postural/ergonomic stressors. Pt responded well to flexion biased stretches and manual mobilization of the affected spinal and myofascial tissues with increased ROM; trial of conservative tx recommended consisting of stretching, graded mobilization/manipulation of the affected spinal and myofascial jt dysfunction, postural/ergonomic education and take home stretches/exercises. If symptoms fail to improve with short trial of conservative care, appropriate imaging and referral will be coordinated.  - Tolerated treatment well with slight decrease in mm spasm, slow but gradual improvements.    PROCEDURE CODES: 64731    TREATMENT:  Fear avoidance behavior discussion; encouraged and reassured pt that natural course of condition is to improve over time with adherence to tx plan and home care strategies. Home care recommendations: avoid bed rest, walk (but avoid trails and uneven surfaces), gradual return to activity to tolerance (avoid anything that peripheralizes symptoms), call if symptoms peripheralize, worsen, or neurologic deficit progresses. Ther-ex: IASTM; discussed post procedure soreness and/or ecchymosis for up to 36 hrs, applied to affected mm hypertonicities; supine hamstring stretch, supine gluteal stretch, side laying QL stretch, single knee to chest stretch, hip flexor pin-and-stretch, alternating prone hip extension, glute bridge, transitional mvmt education,  abdominal bracing; greater than 15 min spent performing above mentioned ther-ex to improve ROM/flexibility. Cervical mobilization- PA CT junction HVLA, Cervical manual traction, Thoracic mobilization/manipulation: prone P-A mob; Lumbar mobilization/manipulation: diversified side laying graded HVLA, flexion-traction; Sacrum- Mobilization- Long axis traction, flexion-distraction, SIJ Manipulation/Mobilization: R/L SIJ HVLA - long axis distraction, byrd drop table maneuver to affected SIJ    HPI:  Zach Cain is a 24 y.o. male  Chief Complaint   Patient presents with   • Neck - Follow-up     Neck pain score 7  Patient states achy feeling.   • Back Pain     Lower lumbar pain score 7   patient states achy feeling .     The patient presents to the office with lower back and mid back pain since Middle school. The patient reports the lower back is worst. The pt is feeling a little better but then it aggravates it again. The patient went to PT in the past and it made it worse. The patient has went chiro before but only the same adjustment. Not working but did work several years ago at [employer]. Sitting more than 3 hrs aggravates back. Standing for longer then 3 hours. The patient used weights and mountain climbers, hurts after and irritates back. He also plays video games. Pain level is 8/10. Heating pad- heats for an hour, Salonpas, hot shower. He is currently working with a Nutritionist. MRI 5/3/21- WNL Pt also mentions he is being managed by Neurologist for migraines.   1/13- Pt feels about the same today. 5-6/10 in the neck and back  1/21/25- Neck and back pain 5-6/10. Felt the best about 2 hours after treatment and then is started slowly creeping back.  1/28- The patient tolerated treatment last visit fairly well, he went to go to the gym, 4/10 neck and back  2/4/25- The pt reports improvements after last visit but then moved a futon.   - The patient is feeling a little better after last visit but he is sore  today, neck and lower back pain will be 6-7/10.  3/4/25- The patient is feeling 5/10 today in the neck and lower back. More of a discomfort and achy feeling.   3/25/25- The patient is feeling sore today 5/10 pain in the neck and back.   4/1/25- Improvements for about an hour after last visit, today pain is a 7/10. Was working out but experiencing progressive overload.     Neck Pain   Associated symptoms include headaches.   Back Pain  Associated symptoms include headaches.     Past Medical History:   Diagnosis Date   • Anxiety    • Asthma    • Migraines       Past Surgical History:   Procedure Laterality Date   • CIRCUMCISION       The following portions of the patient's history were reviewed and updated as appropriate: allergies, past family history, past medical history, past social history, past surgical history, and problem list.  Review of Systems   Musculoskeletal:  Positive for back pain, myalgias, neck pain and neck stiffness.   Neurological:  Positive for headaches.     Physical Exam  Constitutional:       Appearance: Normal appearance.   Musculoskeletal:         General: Tenderness present. Normal range of motion.        Arms:       Cervical back: Normal range of motion. Rigidity and tenderness present.   Skin:     General: Skin is warm.   Neurological:      Mental Status: He is alert and oriented to person, place, and time.      Gait: Gait is intact.      Deep Tendon Reflexes: Reflexes are normal and symmetric.   Psychiatric:         Attention and Perception: Attention normal.         Mood and Affect: Affect normal.         Speech: Speech normal.         Behavior: Behavior is cooperative.         Cognition and Memory: Cognition normal.       SOFT TISSUE ASSESSMENT Hypertonicity and tenderness palpated B C5-T7, Upper trap, lev scap, Sub Occ, SCM, Middle trap, T10-S1 erector spinae, hip flexor, glute med/min, QL, hamstring JOINT RESTRICTIONS: C6-T5, T10-S1 and R SIJ ORTHO: SI jt point tenderness: +;  Kelsea unremarkable for centralization/peripheralization; alejandra's, iliac compression, thigh thrust elicit lbp in R/L SIJ; prone femoral nerve stretch neg for upper lumbar neural tension, elicits R SIJ stiffness; sitting root elicits no lbp on R/L; slump test elicits no neural tension R/L, Spurling- neg, Distraction- neg, Max For compr- Neg    Return in about 1 week (around 4/8/2025) for Recheck.

## 2025-04-08 ENCOUNTER — PROCEDURE VISIT (OUTPATIENT)
Age: 25
End: 2025-04-08
Payer: COMMERCIAL

## 2025-04-08 VITALS
HEIGHT: 71 IN | BODY MASS INDEX: 24.08 KG/M2 | DIASTOLIC BLOOD PRESSURE: 78 MMHG | SYSTOLIC BLOOD PRESSURE: 125 MMHG | WEIGHT: 172 LBS

## 2025-04-08 DIAGNOSIS — M99.04 SEGMENTAL DYSFUNCTION OF SACRAL REGION: Primary | ICD-10-CM

## 2025-04-08 DIAGNOSIS — M99.03 SEGMENTAL DYSFUNCTION OF LUMBAR REGION: ICD-10-CM

## 2025-04-08 DIAGNOSIS — M79.18 MYOFASCIAL PAIN SYNDROME: ICD-10-CM

## 2025-04-08 DIAGNOSIS — M99.01 SEGMENTAL DYSFUNCTION OF CERVICAL REGION: ICD-10-CM

## 2025-04-08 DIAGNOSIS — M54.50 LOW BACK PAIN WITHOUT SCIATICA, UNSPECIFIED BACK PAIN LATERALITY, UNSPECIFIED CHRONICITY: ICD-10-CM

## 2025-04-08 DIAGNOSIS — M99.02 SEGMENTAL DYSFUNCTION OF THORACIC REGION: ICD-10-CM

## 2025-04-08 PROCEDURE — 98941 CHIROPRACT MANJ 3-4 REGIONS: CPT | Performed by: CHIROPRACTOR

## 2025-04-08 NOTE — PROGRESS NOTES
Initial date of service: 1/2/25    Diagnoses and all orders for this visit:    Segmental dysfunction of sacral region    Segmental dysfunction of lumbar region    Segmental dysfunction of thoracic region    Segmental dysfunction of cervical region    Low back pain without sciatica, unspecified back pain laterality, unspecified chronicity    Myofascial pain syndrome       ASSESSMENT:  Pt's symptoms and exam findings consistent with back pain and neck pain. complicated by myofascial pain syndrome secondary to repetitive st/sp injury, exacerbated by postural/ergonomic stressors. Pt responded well to flexion biased stretches and manual mobilization of the affected spinal and myofascial tissues with increased ROM; trial of conservative tx recommended consisting of stretching, graded mobilization/manipulation of the affected spinal and myofascial jt dysfunction, postural/ergonomic education and take home stretches/exercises. If symptoms fail to improve with short trial of conservative care, appropriate imaging and referral will be coordinated.  - Tolerated treatment well with slight decrease in mm spasm, slow but gradual improvements.    PROCEDURE CODES: 50938    TREATMENT:  Fear avoidance behavior discussion; encouraged and reassured pt that natural course of condition is to improve over time with adherence to tx plan and home care strategies. Home care recommendations: avoid bed rest, walk (but avoid trails and uneven surfaces), gradual return to activity to tolerance (avoid anything that peripheralizes symptoms), call if symptoms peripheralize, worsen, or neurologic deficit progresses. Ther-ex: IASTM; discussed post procedure soreness and/or ecchymosis for up to 36 hrs, applied to affected mm hypertonicities; supine hamstring stretch, supine gluteal stretch, side laying QL stretch, single knee to chest stretch, hip flexor pin-and-stretch, alternating prone hip extension, glute bridge, transitional mvmt education,  abdominal bracing; greater than 15 min spent performing above mentioned ther-ex to improve ROM/flexibility. Cervical mobilization- PA CT junction HVLA, Cervical manual traction, Thoracic mobilization/manipulation: prone P-A mob; Lumbar mobilization/manipulation: diversified side laying graded HVLA, flexion-traction; Sacrum- Mobilization- Long axis traction, flexion-distraction, SIJ Manipulation/Mobilization: R/L SIJ HVLA - long axis distraction, byrd drop table maneuver to affected SIJ    HPI:  Zach Cain is a 24 y.o. male  Chief Complaint   Patient presents with   • Neck Pain     Neck pain 7 bilateral    • Back Pain     Low back 7 as well aching      The patient presents to the office with lower back and mid back pain since Middle school. The patient reports the lower back is worst. The pt is feeling a little better but then it aggravates it again. The patient went to PT in the past and it made it worse. The patient has went chiro before but only the same adjustment. Not working but did work several years ago at [employer]. Sitting more than 3 hrs aggravates back. Standing for longer then 3 hours. The patient used weights and mountain climbers, hurts after and irritates back. He also plays video games. Pain level is 8/10. Heating pad- heats for an hour, Salonpas, hot shower. He is currently working with a Nutritionist. MRI 5/3/21- WNL Pt also mentions he is being managed by Neurologist for migraines.   1/13- Pt feels about the same today. 5-6/10 in the neck and back  1/21/25- Neck and back pain 5-6/10. Felt the best about 2 hours after treatment and then is started slowly creeping back.  1/28- The patient tolerated treatment last visit fairly well, he went to go to the gym, 4/10 neck and back  2/4/25- The pt reports improvements after last visit but then moved a futon.   - The patient is feeling a little better after last visit but he is sore today, neck and lower back pain will be 6-7/10.  3/4/25- The  patient is feeling 5/10 today in the neck and lower back. More of a discomfort and achy feeling.   3/25/25- The patient is feeling sore today 5/10 pain in the neck and back.   4/1/25- Improvements for about an hour after last visit, today pain is a 7/10. Was working out but experiencing progressive overload.   4/8/25- The patient is feeling a little better, but rates 7/10 pain in the neck and lower back.     Neck Pain   Associated symptoms include headaches.   Back Pain  Associated symptoms include headaches.     Past Medical History:   Diagnosis Date   • Anxiety    • Asthma    • Migraines       Past Surgical History:   Procedure Laterality Date   • CIRCUMCISION       The following portions of the patient's history were reviewed and updated as appropriate: allergies, past family history, past medical history, past social history, past surgical history, and problem list.  Review of Systems   Musculoskeletal:  Positive for back pain, myalgias, neck pain and neck stiffness.   Neurological:  Positive for headaches.     Physical Exam  Constitutional:       Appearance: Normal appearance.   Musculoskeletal:         General: Tenderness present. Normal range of motion.        Arms:       Cervical back: Normal range of motion. Rigidity and tenderness present.   Skin:     General: Skin is warm.   Neurological:      Mental Status: He is alert and oriented to person, place, and time.      Gait: Gait is intact.      Deep Tendon Reflexes: Reflexes are normal and symmetric.   Psychiatric:         Attention and Perception: Attention normal.         Mood and Affect: Affect normal.         Speech: Speech normal.         Behavior: Behavior is cooperative.         Cognition and Memory: Cognition normal.       SOFT TISSUE ASSESSMENT Hypertonicity and tenderness palpated B C5-T7, Upper trap, lev scap, Sub Occ, SCM, Middle trap, T10-S1 erector spinae, hip flexor, glute med/min, QL, hamstring JOINT RESTRICTIONS: C6-T5, T10-S1 and R SIJ  ORTHO: SI jt point tenderness: +; Kelsea unremarkable for centralization/peripheralization; alejandra's, iliac compression, thigh thrust elicit lbp in R/L SIJ; prone femoral nerve stretch neg for upper lumbar neural tension, elicits R SIJ stiffness; sitting root elicits no lbp on R/L; slump test elicits no neural tension R/L, Spurling- neg, Distraction- neg, Max For compr- Neg    Return in about 1 week (around 4/15/2025) for Recheck.

## 2025-04-15 ENCOUNTER — PROCEDURE VISIT (OUTPATIENT)
Age: 25
End: 2025-04-15
Payer: COMMERCIAL

## 2025-04-15 VITALS
WEIGHT: 172 LBS | SYSTOLIC BLOOD PRESSURE: 119 MMHG | DIASTOLIC BLOOD PRESSURE: 76 MMHG | BODY MASS INDEX: 24.08 KG/M2 | HEART RATE: 77 BPM | HEIGHT: 71 IN

## 2025-04-15 DIAGNOSIS — M54.50 LOW BACK PAIN WITHOUT SCIATICA, UNSPECIFIED BACK PAIN LATERALITY, UNSPECIFIED CHRONICITY: ICD-10-CM

## 2025-04-15 DIAGNOSIS — M99.02 SEGMENTAL DYSFUNCTION OF THORACIC REGION: ICD-10-CM

## 2025-04-15 DIAGNOSIS — M99.04 SEGMENTAL DYSFUNCTION OF SACRAL REGION: Primary | ICD-10-CM

## 2025-04-15 DIAGNOSIS — M79.18 MYOFASCIAL PAIN SYNDROME: ICD-10-CM

## 2025-04-15 DIAGNOSIS — M99.03 SEGMENTAL DYSFUNCTION OF LUMBAR REGION: ICD-10-CM

## 2025-04-15 DIAGNOSIS — M99.01 SEGMENTAL DYSFUNCTION OF CERVICAL REGION: ICD-10-CM

## 2025-04-15 PROCEDURE — 98941 CHIROPRACT MANJ 3-4 REGIONS: CPT | Performed by: CHIROPRACTOR

## 2025-04-15 NOTE — PROGRESS NOTES
Initial date of service: 1/2/25    Diagnoses and all orders for this visit:    Segmental dysfunction of sacral region    Segmental dysfunction of lumbar region    Segmental dysfunction of thoracic region    Segmental dysfunction of cervical region    Low back pain without sciatica, unspecified back pain laterality, unspecified chronicity    Myofascial pain syndrome       ASSESSMENT:  Pt's symptoms and exam findings consistent with back pain and neck pain. complicated by myofascial pain syndrome secondary to repetitive st/sp injury, exacerbated by postural/ergonomic stressors. Pt responded well to flexion biased stretches and manual mobilization of the affected spinal and myofascial tissues with increased ROM; trial of conservative tx recommended consisting of stretching, graded mobilization/manipulation of the affected spinal and myofascial jt dysfunction, postural/ergonomic education and take home stretches/exercises. If symptoms fail to improve with short trial of conservative care, appropriate imaging and referral will be coordinated.  - Tolerated treatment well with slight decrease in mm spasm, slow but gradual improvements.    PROCEDURE CODES: 26085    TREATMENT:  Fear avoidance behavior discussion; encouraged and reassured pt that natural course of condition is to improve over time with adherence to tx plan and home care strategies. Home care recommendations: avoid bed rest, walk (but avoid trails and uneven surfaces), gradual return to activity to tolerance (avoid anything that peripheralizes symptoms), call if symptoms peripheralize, worsen, or neurologic deficit progresses. Ther-ex: IASTM; discussed post procedure soreness and/or ecchymosis for up to 36 hrs, applied to affected mm hypertonicities; supine hamstring stretch, supine gluteal stretch, side laying QL stretch, single knee to chest stretch, hip flexor pin-and-stretch, alternating prone hip extension, glute bridge, transitional mvmt education,  abdominal bracing; greater than 15 min spent performing above mentioned ther-ex to improve ROM/flexibility. Cervical mobilization- PA CT junction HVLA, Cervical manual traction, Thoracic mobilization/manipulation: prone P-A mob; Lumbar mobilization/manipulation: diversified side laying graded HVLA, flexion-traction; Sacrum- Mobilization- Long axis traction, flexion-distraction, SIJ Manipulation/Mobilization: R/L SIJ HVLA - long axis distraction, byrd drop table maneuver to affected SIJ    HPI:  Zach Cain is a 24 y.o. male  Chief Complaint   Patient presents with   • Neck - Follow-up     Neck pain score 5-6    Patient states achy and stiff       • Back Pain     Lower lumbar pain score 5-6     Patient states achy and stiff       The patient presents to the office with lower back and mid back pain since Middle school. The patient reports the lower back is worst. The pt is feeling a little better but then it aggravates it again. The patient went to PT in the past and it made it worse. The patient has went chiro before but only the same adjustment. Not working but did work several years ago at [employer]. Sitting more than 3 hrs aggravates back. Standing for longer then 3 hours. The patient used weights and mountain climbers, hurts after and irritates back. He also plays video games. Pain level is 8/10. Heating pad- heats for an hour, Salonpas, hot shower. He is currently working with a Nutritionist. MRI 5/3/21- WNL Pt also mentions he is being managed by Neurologist for migraines.   1/13- Pt feels about the same today. 5-6/10 in the neck and back  1/21/25- Neck and back pain 5-6/10. Felt the best about 2 hours after treatment and then is started slowly creeping back.  1/28- The patient tolerated treatment last visit fairly well, he went to go to the gym, 4/10 neck and back  2/4/25- The pt reports improvements after last visit but then moved a futon.   - The patient is feeling a little better after last visit but  he is sore today, neck and lower back pain will be 6-7/10.  3/4/25- The patient is feeling 5/10 today in the neck and lower back. More of a discomfort and achy feeling.   3/25/25- The patient is feeling sore today 5/10 pain in the neck and back.   4/1/25- Improvements for about an hour after last visit, today pain is a 7/10. Was working out but experiencing progressive overload.   4/8/25- The patient is feeling a little better, but rates 7/10 pain in the neck and lower back.   4/15/25- The patient 5-6/10 in the neck and lower back,    Neck Pain   Associated symptoms include headaches.   Back Pain  Associated symptoms include headaches.     Past Medical History:   Diagnosis Date   • Anxiety    • Asthma    • Migraines       Past Surgical History:   Procedure Laterality Date   • CIRCUMCISION       The following portions of the patient's history were reviewed and updated as appropriate: allergies, past family history, past medical history, past social history, past surgical history, and problem list.  Review of Systems   Musculoskeletal:  Positive for back pain, myalgias, neck pain and neck stiffness.   Neurological:  Positive for headaches.     Physical Exam  Constitutional:       Appearance: Normal appearance.   Musculoskeletal:         General: Tenderness present. Normal range of motion.        Arms:       Cervical back: Normal range of motion. Rigidity and tenderness present.   Skin:     General: Skin is warm.   Neurological:      Mental Status: He is alert and oriented to person, place, and time.      Gait: Gait is intact.      Deep Tendon Reflexes: Reflexes are normal and symmetric.   Psychiatric:         Attention and Perception: Attention normal.         Mood and Affect: Affect normal.         Speech: Speech normal.         Behavior: Behavior is cooperative.         Cognition and Memory: Cognition normal.       SOFT TISSUE ASSESSMENT Hypertonicity and tenderness palpated B C5-T7, Upper trap, lev scap, Sub Occ,  SCM, Middle trap, T10-S1 erector spinae, hip flexor, glute med/min, QL, hamstring JOINT RESTRICTIONS: C6-T5, T10-S1 and R SIJ ORTHO: SI jt point tenderness: +; Kelsea unremarkable for centralization/peripheralization; alejandra's, iliac compression, thigh thrust elicit lbp in R/L SIJ; prone femoral nerve stretch neg for upper lumbar neural tension, elicits R SIJ stiffness; sitting root elicits no lbp on R/L; slump test elicits no neural tension R/L, Spurling- neg, Distraction- neg, Max For compr- Neg    Return in about 1 week (around 4/22/2025) for Recheck.

## 2025-04-22 ENCOUNTER — PROCEDURE VISIT (OUTPATIENT)
Age: 25
End: 2025-04-22
Payer: COMMERCIAL

## 2025-04-22 VITALS
SYSTOLIC BLOOD PRESSURE: 114 MMHG | HEIGHT: 71 IN | WEIGHT: 172 LBS | DIASTOLIC BLOOD PRESSURE: 62 MMHG | HEART RATE: 83 BPM | BODY MASS INDEX: 24.08 KG/M2

## 2025-04-22 DIAGNOSIS — M99.02 SEGMENTAL DYSFUNCTION OF THORACIC REGION: ICD-10-CM

## 2025-04-22 DIAGNOSIS — M54.50 LOW BACK PAIN WITHOUT SCIATICA, UNSPECIFIED BACK PAIN LATERALITY, UNSPECIFIED CHRONICITY: ICD-10-CM

## 2025-04-22 DIAGNOSIS — M79.18 MYOFASCIAL PAIN SYNDROME: ICD-10-CM

## 2025-04-22 DIAGNOSIS — M99.01 SEGMENTAL DYSFUNCTION OF CERVICAL REGION: ICD-10-CM

## 2025-04-22 DIAGNOSIS — M99.04 SEGMENTAL DYSFUNCTION OF SACRAL REGION: Primary | ICD-10-CM

## 2025-04-22 DIAGNOSIS — M99.03 SEGMENTAL DYSFUNCTION OF LUMBAR REGION: ICD-10-CM

## 2025-04-22 PROCEDURE — 98941 CHIROPRACT MANJ 3-4 REGIONS: CPT | Performed by: CHIROPRACTOR

## 2025-04-22 NOTE — PROGRESS NOTES
Initial date of service: 1/2/25    Diagnoses and all orders for this visit:    Segmental dysfunction of sacral region    Segmental dysfunction of lumbar region    Segmental dysfunction of thoracic region    Segmental dysfunction of cervical region    Low back pain without sciatica, unspecified back pain laterality, unspecified chronicity    Myofascial pain syndrome       ASSESSMENT:  Pt's symptoms and exam findings consistent with back pain and neck pain. complicated by myofascial pain syndrome secondary to repetitive st/sp injury, exacerbated by postural/ergonomic stressors. Pt responded well to flexion biased stretches and manual mobilization of the affected spinal and myofascial tissues with increased ROM; trial of conservative tx recommended consisting of stretching, graded mobilization/manipulation of the affected spinal and myofascial jt dysfunction, postural/ergonomic education and take home stretches/exercises. If symptoms fail to improve with short trial of conservative care, appropriate imaging and referral will be coordinated.  - Tolerated treatment well with slight decrease in mm spasm, slow but gradual improvements.    PROCEDURE CODES: 06903    TREATMENT:  Fear avoidance behavior discussion; encouraged and reassured pt that natural course of condition is to improve over time with adherence to tx plan and home care strategies. Home care recommendations: avoid bed rest, walk (but avoid trails and uneven surfaces), gradual return to activity to tolerance (avoid anything that peripheralizes symptoms), call if symptoms peripheralize, worsen, or neurologic deficit progresses. Ther-ex: IASTM; discussed post procedure soreness and/or ecchymosis for up to 36 hrs, applied to affected mm hypertonicities; supine hamstring stretch, supine gluteal stretch, side laying QL stretch, single knee to chest stretch, hip flexor pin-and-stretch, alternating prone hip extension, glute bridge, transitional mvmt education,  abdominal bracing; greater than 15 min spent performing above mentioned ther-ex to improve ROM/flexibility. Cervical mobilization- PA CT junction HVLA, Cervical manual traction, Thoracic mobilization/manipulation: prone P-A mob; Lumbar mobilization/manipulation: diversified side laying graded HVLA, flexion-traction; Sacrum- Mobilization- Long axis traction, flexion-distraction, SIJ Manipulation/Mobilization: R/L SIJ HVLA - long axis distraction, byrd drop table maneuver to affected SIJ    HPI:  Zach Cain is a 24 y.o. male  Chief Complaint   Patient presents with   • Neck - Follow-up     Patient states neck is achy . Pain score 4      • Back Pain     Lower lumbar pain score 4     Patient states tight .     The patient presents to the office with lower back and mid back pain since Middle school. The patient reports the lower back is worst. The pt is feeling a little better but then it aggravates it again. The patient went to PT in the past and it made it worse. The patient has went chiro before but only the same adjustment. Not working but did work several years ago at [employer]. Sitting more than 3 hrs aggravates back. Standing for longer then 3 hours. The patient used weights and mountain climbers, hurts after and irritates back. He also plays video games. Pain level is 8/10. Heating pad- heats for an hour, Salonpas, hot shower. He is currently working with a Nutritionist. MRI 5/3/21- WNL Pt also mentions he is being managed by Neurologist for migraines.   1/13- Pt feels about the same today. 5-6/10 in the neck and back  1/21/25- Neck and back pain 5-6/10. Felt the best about 2 hours after treatment and then is started slowly creeping back.  1/28- The patient tolerated treatment last visit fairly well, he went to go to the gym, 4/10 neck and back  2/4/25- The pt reports improvements after last visit but then moved a futon.   - The patient is feeling a little better after last visit but he is sore today, neck  and lower back pain will be 6-7/10.  3/4/25- The patient is feeling 5/10 today in the neck and lower back. More of a discomfort and achy feeling.   3/25/25- The patient is feeling sore today 5/10 pain in the neck and back.   4/1/25- Improvements for about an hour after last visit, today pain is a 7/10. Was working out but experiencing progressive overload.   4/8/25- The patient is feeling a little better, but rates 7/10 pain in the neck and lower back.   4/15/25- The patient 5-6/10 in the neck and lower back,  4/22/25- The patient is feeling 4/10 in the neck and back.     Neck Pain   Associated symptoms include headaches.   Back Pain  Associated symptoms include headaches.     Past Medical History:   Diagnosis Date   • Anxiety    • Asthma    • Migraines       Past Surgical History:   Procedure Laterality Date   • CIRCUMCISION       The following portions of the patient's history were reviewed and updated as appropriate: allergies, past family history, past medical history, past social history, past surgical history, and problem list.  Review of Systems   Musculoskeletal:  Positive for back pain, myalgias, neck pain and neck stiffness.   Neurological:  Positive for headaches.     Physical Exam  Constitutional:       Appearance: Normal appearance.   Musculoskeletal:         General: Tenderness present. Normal range of motion.        Arms:       Cervical back: Normal range of motion. Rigidity and tenderness present.   Skin:     General: Skin is warm.   Neurological:      Mental Status: He is alert and oriented to person, place, and time.      Gait: Gait is intact.      Deep Tendon Reflexes: Reflexes are normal and symmetric.   Psychiatric:         Attention and Perception: Attention normal.         Mood and Affect: Affect normal.         Speech: Speech normal.         Behavior: Behavior is cooperative.         Cognition and Memory: Cognition normal.     SOFT TISSUE ASSESSMENT Hypertonicity and tenderness palpated B  C5-T7, Upper trap, lev scap, Sub Occ, SCM, Middle trap, T10-S1 erector spinae, hip flexor, glute med/min, QL, hamstring JOINT RESTRICTIONS: C6-T5, T10-S1 and R SIJ ORTHO: SI jt point tenderness: +; Kelsea unremarkable for centralization/peripheralization; alejandra's, iliac compression, thigh thrust elicit lbp in R/L SIJ; prone femoral nerve stretch neg for upper lumbar neural tension, elicits R SIJ stiffness; sitting root elicits no lbp on R/L; slump test elicits no neural tension R/L, Spurling- neg, Distraction- neg, Max For compr- Neg    Return in about 1 week (around 4/29/2025) for Recheck.

## 2025-05-06 ENCOUNTER — PROCEDURE VISIT (OUTPATIENT)
Age: 25
End: 2025-05-06
Payer: COMMERCIAL

## 2025-05-06 VITALS
HEIGHT: 71 IN | BODY MASS INDEX: 24.08 KG/M2 | WEIGHT: 172 LBS | SYSTOLIC BLOOD PRESSURE: 108 MMHG | DIASTOLIC BLOOD PRESSURE: 76 MMHG

## 2025-05-06 DIAGNOSIS — M99.02 SEGMENTAL DYSFUNCTION OF THORACIC REGION: ICD-10-CM

## 2025-05-06 DIAGNOSIS — M79.18 MYOFASCIAL PAIN SYNDROME: ICD-10-CM

## 2025-05-06 DIAGNOSIS — M99.04 SEGMENTAL DYSFUNCTION OF SACRAL REGION: Primary | ICD-10-CM

## 2025-05-06 DIAGNOSIS — M99.03 SEGMENTAL DYSFUNCTION OF LUMBAR REGION: ICD-10-CM

## 2025-05-06 DIAGNOSIS — M54.50 LOW BACK PAIN WITHOUT SCIATICA, UNSPECIFIED BACK PAIN LATERALITY, UNSPECIFIED CHRONICITY: ICD-10-CM

## 2025-05-06 DIAGNOSIS — M99.01 SEGMENTAL DYSFUNCTION OF CERVICAL REGION: ICD-10-CM

## 2025-05-06 PROCEDURE — 98941 CHIROPRACT MANJ 3-4 REGIONS: CPT | Performed by: CHIROPRACTOR

## 2025-05-06 NOTE — PROGRESS NOTES
Initial date of service: 1/2/25    Diagnoses and all orders for this visit:    Segmental dysfunction of sacral region    Segmental dysfunction of lumbar region    Segmental dysfunction of thoracic region    Segmental dysfunction of cervical region    Low back pain without sciatica, unspecified back pain laterality, unspecified chronicity    Myofascial pain syndrome       ASSESSMENT:  Pt's symptoms and exam findings consistent with back pain and neck pain. complicated by myofascial pain syndrome secondary to repetitive st/sp injury, exacerbated by postural/ergonomic stressors. Pt responded well to flexion biased stretches and manual mobilization of the affected spinal and myofascial tissues with increased ROM; trial of conservative tx recommended consisting of stretching, graded mobilization/manipulation of the affected spinal and myofascial jt dysfunction, postural/ergonomic education and take home stretches/exercises. If symptoms fail to improve with short trial of conservative care, appropriate imaging and referral will be coordinated.  - Tolerated treatment well with slight decrease in mm spasm, slow but gradual improvements.    PROCEDURE CODES: 70546    TREATMENT:  Fear avoidance behavior discussion; encouraged and reassured pt that natural course of condition is to improve over time with adherence to tx plan and home care strategies. Home care recommendations: avoid bed rest, walk (but avoid trails and uneven surfaces), gradual return to activity to tolerance (avoid anything that peripheralizes symptoms), call if symptoms peripheralize, worsen, or neurologic deficit progresses. Ther-ex: IASTM; discussed post procedure soreness and/or ecchymosis for up to 36 hrs, applied to affected mm hypertonicities; supine hamstring stretch, supine gluteal stretch, side laying QL stretch, single knee to chest stretch, hip flexor pin-and-stretch, alternating prone hip extension, glute bridge, transitional mvmt education,  abdominal bracing; greater than 15 min spent performing above mentioned ther-ex to improve ROM/flexibility. Cervical mobilization- PA CT junction HVLA, Cervical manual traction, Thoracic mobilization/manipulation: prone P-A mob; Lumbar mobilization/manipulation: diversified side laying graded HVLA, flexion-traction; Sacrum- Mobilization- Long axis traction, flexion-distraction, SIJ Manipulation/Mobilization: R/L SIJ HVLA - long axis distraction, byrd drop table maneuver to affected SIJ    HPI:  Zach Cain is a 24 y.o. male  Chief Complaint   Patient presents with   • Neck Pain     Neck pain 6 bilateral    • Back Pain     Low back about a 5 bilateral      The patient presents to the office with lower back and mid back pain since Middle school. The patient reports the lower back is worst. The pt is feeling a little better but then it aggravates it again. The patient went to PT in the past and it made it worse. The patient has went chiro before but only the same adjustment. Not working but did work several years ago at [employer]. Sitting more than 3 hrs aggravates back. Standing for longer then 3 hours. The patient used weights and mountain climbers, hurts after and irritates back. He also plays video games. Pain level is 8/10. Heating pad- heats for an hour, Salonpas, hot shower. He is currently working with a Nutritionist. MRI 5/3/21- WNL Pt also mentions he is being managed by Neurologist for migraines.   1/13- Pt feels about the same today. 5-6/10 in the neck and back  1/21/25- Neck and back pain 5-6/10. Felt the best about 2 hours after treatment and then is started slowly creeping back.  1/28- The patient tolerated treatment last visit fairly well, he went to go to the gym, 4/10 neck and back  2/4/25- The pt reports improvements after last visit but then moved a futon.   - The patient is feeling a little better after last visit but he is sore today, neck and lower back pain will be 6-7/10.  3/4/25- The  patient is feeling 5/10 today in the neck and lower back. More of a discomfort and achy feeling.   3/25/25- The patient is feeling sore today 5/10 pain in the neck and back.   4/1/25- Improvements for about an hour after last visit, today pain is a 7/10. Was working out but experiencing progressive overload.   4/8/25- The patient is feeling a little better, but rates 7/10 pain in the neck and lower back.   4/15/25- The patient 5-6/10 in the neck and lower back,  4/22/25- The patient is feeling 4/10 in the neck and back.   5/6/25- The patient reports pain in the neck is 6/10 and the lower back is 5/10,. He has not been good with exercises and stretches.     Neck Pain   Associated symptoms include headaches.   Back Pain  Associated symptoms include headaches.     Past Medical History:   Diagnosis Date   • Anxiety    • Asthma    • Migraines       Past Surgical History:   Procedure Laterality Date   • CIRCUMCISION       The following portions of the patient's history were reviewed and updated as appropriate: allergies, past family history, past medical history, past social history, past surgical history, and problem list.  Review of Systems   Musculoskeletal:  Positive for back pain, myalgias, neck pain and neck stiffness.   Neurological:  Positive for headaches.     Physical Exam  Constitutional:       Appearance: Normal appearance.   Musculoskeletal:         General: Tenderness present. Normal range of motion.        Arms:       Cervical back: Normal range of motion. Rigidity and tenderness present.   Skin:     General: Skin is warm.   Neurological:      Mental Status: He is alert and oriented to person, place, and time.      Gait: Gait is intact.      Deep Tendon Reflexes: Reflexes are normal and symmetric.   Psychiatric:         Attention and Perception: Attention normal.         Mood and Affect: Affect normal.         Speech: Speech normal.         Behavior: Behavior is cooperative.         Cognition and Memory:  Cognition normal.       SOFT TISSUE ASSESSMENT Hypertonicity and tenderness palpated B C5-T7, Upper trap, lev scap, Sub Occ, SCM, Middle trap, T10-S1 erector spinae, hip flexor, glute med/min, QL, hamstring JOINT RESTRICTIONS: C6-T5, T10-S1 and R SIJ ORTHO: SI jt point tenderness: +; Kelsea unremarkable for centralization/peripheralization; alejandra's, iliac compression, thigh thrust elicit lbp in R/L SIJ; prone femoral nerve stretch neg for upper lumbar neural tension, elicits R SIJ stiffness; sitting root elicits no lbp on R/L; slump test elicits no neural tension R/L, Spurling- neg, Distraction- neg, Max For compr- Neg    Return in about 1 week (around 5/13/2025) for Recheck.

## 2025-05-20 ENCOUNTER — PROCEDURE VISIT (OUTPATIENT)
Age: 25
End: 2025-05-20
Payer: COMMERCIAL

## 2025-05-20 VITALS
DIASTOLIC BLOOD PRESSURE: 77 MMHG | HEIGHT: 71 IN | WEIGHT: 171.96 LBS | SYSTOLIC BLOOD PRESSURE: 116 MMHG | HEART RATE: 72 BPM | BODY MASS INDEX: 24.07 KG/M2

## 2025-05-20 DIAGNOSIS — M99.04 SEGMENTAL DYSFUNCTION OF SACRAL REGION: Primary | ICD-10-CM

## 2025-05-20 DIAGNOSIS — M99.02 SEGMENTAL DYSFUNCTION OF THORACIC REGION: ICD-10-CM

## 2025-05-20 DIAGNOSIS — M99.03 SEGMENTAL DYSFUNCTION OF LUMBAR REGION: ICD-10-CM

## 2025-05-20 DIAGNOSIS — M79.18 MYOFASCIAL PAIN SYNDROME: ICD-10-CM

## 2025-05-20 DIAGNOSIS — M99.01 SEGMENTAL DYSFUNCTION OF CERVICAL REGION: ICD-10-CM

## 2025-05-20 DIAGNOSIS — M54.50 LOW BACK PAIN WITHOUT SCIATICA, UNSPECIFIED BACK PAIN LATERALITY, UNSPECIFIED CHRONICITY: ICD-10-CM

## 2025-05-20 PROCEDURE — 98941 CHIROPRACT MANJ 3-4 REGIONS: CPT | Performed by: CHIROPRACTOR

## 2025-05-20 NOTE — PROGRESS NOTES
Initial date of service: 1/2/25    Diagnoses and all orders for this visit:    Segmental dysfunction of sacral region    Segmental dysfunction of lumbar region    Segmental dysfunction of thoracic region    Segmental dysfunction of cervical region    Low back pain without sciatica, unspecified back pain laterality, unspecified chronicity    Myofascial pain syndrome       ASSESSMENT:  Pt's symptoms and exam findings consistent with back pain and neck pain. complicated by myofascial pain syndrome secondary to repetitive st/sp injury, exacerbated by postural/ergonomic stressors. Pt responded well to flexion biased stretches and manual mobilization of the affected spinal and myofascial tissues with increased ROM; trial of conservative tx recommended consisting of stretching, graded mobilization/manipulation of the affected spinal and myofascial jt dysfunction, postural/ergonomic education and take home stretches/exercises. If symptoms fail to improve with short trial of conservative care, appropriate imaging and referral will be coordinated.  - Tolerated treatment well with slight decrease in mm spasm, slow but gradual improvements.    PROCEDURE CODES: 56167    TREATMENT:  Fear avoidance behavior discussion; encouraged and reassured pt that natural course of condition is to improve over time with adherence to tx plan and home care strategies. Home care recommendations: avoid bed rest, walk (but avoid trails and uneven surfaces), gradual return to activity to tolerance (avoid anything that peripheralizes symptoms), call if symptoms peripheralize, worsen, or neurologic deficit progresses. Ther-ex: IASTM; discussed post procedure soreness and/or ecchymosis for up to 36 hrs, applied to affected mm hypertonicities; supine hamstring stretch, supine gluteal stretch, side laying QL stretch, single knee to chest stretch, hip flexor pin-and-stretch, alternating prone hip extension, glute bridge, transitional mvmt education,  abdominal bracing; greater than 15 min spent performing above mentioned ther-ex to improve ROM/flexibility. Cervical mobilization- PA CT junction HVLA, Cervical manual traction, Thoracic mobilization/manipulation: prone P-A mob; Lumbar mobilization/manipulation: diversified side laying graded HVLA, flexion-traction; Sacrum- Mobilization- Long axis traction, flexion-distraction, SIJ Manipulation/Mobilization: R/L SIJ HVLA - long axis distraction, byrd drop table maneuver to affected SIJ    HPI:  Zach Cain is a 24 y.o. male  Chief Complaint   Patient presents with   • Neck Pain     Neck pain bilateral achy pain    pain score 5   • Back Pain     Low back pain is achy   pain score 6     The patient presents to the office with lower back and mid back pain since Middle school. The patient reports the lower back is worst. The pt is feeling a little better but then it aggravates it again. The patient went to PT in the past and it made it worse. The patient has went chiro before but only the same adjustment. Not working but did work several years ago at [employer]. Sitting more than 3 hrs aggravates back. Standing for longer then 3 hours. The patient used weights and mountain climbers, hurts after and irritates back. He also plays video games. Pain level is 8/10. Heating pad- heats for an hour, Salonpas, hot shower. He is currently working with a Nutritionist. MRI 5/3/21- WNL Pt also mentions he is being managed by Neurologist for migraines.   1/13- Pt feels about the same today. 5-6/10 in the neck and back  1/21/25- Neck and back pain 5-6/10. Felt the best about 2 hours after treatment and then is started slowly creeping back.  1/28- The patient tolerated treatment last visit fairly well, he went to go to the gym, 4/10 neck and back  2/4/25- The pt reports improvements after last visit but then moved a futon.   - The patient is feeling a little better after last visit but he is sore today, neck and lower back pain  will be 6-7/10.  3/4/25- The patient is feeling 5/10 today in the neck and lower back. More of a discomfort and achy feeling.   3/25/25- The patient is feeling sore today 5/10 pain in the neck and back.   4/1/25- Improvements for about an hour after last visit, today pain is a 7/10. Was working out but experiencing progressive overload.   4/8/25- The patient is feeling a little better, but rates 7/10 pain in the neck and lower back.   4/15/25- The patient 5-6/10 in the neck and lower back,  4/22/25- The patient is feeling 4/10 in the neck and back.   5/6/25- The patient reports pain in the neck is 6/10 and the lower back is 5/10,. He has not been good with exercises and stretches.   5/20/25- The pt reports  neck pain is a 5/10 and the back is a 6/10.    Neck Pain   Associated symptoms include headaches.   Back Pain  Associated symptoms include headaches.     Past Medical History:   Diagnosis Date   • Anxiety    • Asthma    • Migraines       Past Surgical History:   Procedure Laterality Date   • CIRCUMCISION       The following portions of the patient's history were reviewed and updated as appropriate: allergies, past family history, past medical history, past social history, past surgical history, and problem list.  Review of Systems   Musculoskeletal:  Positive for back pain, myalgias, neck pain and neck stiffness.   Neurological:  Positive for headaches.     Physical Exam  Constitutional:       Appearance: Normal appearance.     Musculoskeletal:         General: Tenderness present. Normal range of motion.        Arms:       Cervical back: Normal range of motion. Rigidity and tenderness present.     Skin:     General: Skin is warm.     Neurological:      Mental Status: He is alert and oriented to person, place, and time.      Gait: Gait is intact.      Deep Tendon Reflexes: Reflexes are normal and symmetric.     Psychiatric:         Attention and Perception: Attention normal.         Mood and Affect: Affect normal.          Speech: Speech normal.         Behavior: Behavior is cooperative.         Cognition and Memory: Cognition normal.       SOFT TISSUE ASSESSMENT Hypertonicity and tenderness palpated B C5-T7, Upper trap, lev scap, Sub Occ, SCM, Middle trap, T10-S1 erector spinae, hip flexor, glute med/min, QL, hamstring JOINT RESTRICTIONS: C6-T5, T10-S1 and R SIJ ORTHO: SI jt point tenderness: +; Kelsea unremarkable for centralization/peripheralization; alejandra's, iliac compression, thigh thrust elicit lbp in R/L SIJ; prone femoral nerve stretch neg for upper lumbar neural tension, elicits R SIJ stiffness; sitting root elicits no lbp on R/L; slump test elicits no neural tension R/L, Spurling- neg, Distraction- neg, Max For compr- Neg    Return in about 1 week (around 5/27/2025) for Recheck.

## 2025-05-27 ENCOUNTER — PROCEDURE VISIT (OUTPATIENT)
Age: 25
End: 2025-05-27
Payer: COMMERCIAL

## 2025-05-27 VITALS
HEIGHT: 71 IN | HEART RATE: 68 BPM | DIASTOLIC BLOOD PRESSURE: 74 MMHG | WEIGHT: 171 LBS | SYSTOLIC BLOOD PRESSURE: 122 MMHG | BODY MASS INDEX: 23.94 KG/M2

## 2025-05-27 DIAGNOSIS — M54.50 LOW BACK PAIN WITHOUT SCIATICA, UNSPECIFIED BACK PAIN LATERALITY, UNSPECIFIED CHRONICITY: ICD-10-CM

## 2025-05-27 DIAGNOSIS — M79.18 MYOFASCIAL PAIN SYNDROME: ICD-10-CM

## 2025-05-27 DIAGNOSIS — M99.01 SEGMENTAL DYSFUNCTION OF CERVICAL REGION: ICD-10-CM

## 2025-05-27 DIAGNOSIS — M99.03 SEGMENTAL DYSFUNCTION OF LUMBAR REGION: ICD-10-CM

## 2025-05-27 DIAGNOSIS — M99.02 SEGMENTAL DYSFUNCTION OF THORACIC REGION: ICD-10-CM

## 2025-05-27 DIAGNOSIS — M99.04 SEGMENTAL DYSFUNCTION OF SACRAL REGION: Primary | ICD-10-CM

## 2025-05-27 PROCEDURE — 98941 CHIROPRACT MANJ 3-4 REGIONS: CPT | Performed by: CHIROPRACTOR

## 2025-05-27 NOTE — PROGRESS NOTES
Initial date of service: 1/2/25    Diagnoses and all orders for this visit:    Segmental dysfunction of sacral region    Segmental dysfunction of lumbar region    Segmental dysfunction of thoracic region    Segmental dysfunction of cervical region    Low back pain without sciatica, unspecified back pain laterality, unspecified chronicity    Myofascial pain syndrome       ASSESSMENT:  Pt's symptoms and exam findings consistent with back pain and neck pain. complicated by myofascial pain syndrome secondary to repetitive st/sp injury, exacerbated by postural/ergonomic stressors. Pt responded well to flexion biased stretches and manual mobilization of the affected spinal and myofascial tissues with increased ROM; trial of conservative tx recommended consisting of stretching, graded mobilization/manipulation of the affected spinal and myofascial jt dysfunction, postural/ergonomic education and take home stretches/exercises. If symptoms fail to improve with short trial of conservative care, appropriate imaging and referral will be coordinated.  - Tolerated treatment well with slight decrease in mm spasm, slow but gradual improvements.    PROCEDURE CODES: 87367    TREATMENT:  Fear avoidance behavior discussion; encouraged and reassured pt that natural course of condition is to improve over time with adherence to tx plan and home care strategies. Home care recommendations: avoid bed rest, walk (but avoid trails and uneven surfaces), gradual return to activity to tolerance (avoid anything that peripheralizes symptoms), call if symptoms peripheralize, worsen, or neurologic deficit progresses. Ther-ex: IASTM; discussed post procedure soreness and/or ecchymosis for up to 36 hrs, applied to affected mm hypertonicities; supine hamstring stretch, supine gluteal stretch, side laying QL stretch, single knee to chest stretch, hip flexor pin-and-stretch, alternating prone hip extension, glute bridge, transitional mvmt education,  abdominal bracing; greater than 15 min spent performing above mentioned ther-ex to improve ROM/flexibility. Cervical mobilization- PA CT junction HVLA, Cervical manual traction, Thoracic mobilization/manipulation: prone P-A mob; Lumbar mobilization/manipulation: diversified side laying graded HVLA, flexion-traction; Sacrum- Mobilization- Long axis traction, flexion-distraction, SIJ Manipulation/Mobilization: R/L SIJ HVLA - long axis distraction, byrd drop table maneuver to affected SIJ    HPI:  Zach Cain is a 24 y.o. male  Chief Complaint   Patient presents with   • Neck - Follow-up     Neck pain score 5    Patient states achy pain    • Back Pain     Lower lumbar pain score 6      Patient states achy pain      The patient presents to the office with lower back and mid back pain since Middle school. The patient reports the lower back is worst. The pt is feeling a little better but then it aggravates it again. The patient went to PT in the past and it made it worse. The patient has went chiro before but only the same adjustment. Not working but did work several years ago at [employer]. Sitting more than 3 hrs aggravates back. Standing for longer then 3 hours. The patient used weights and mountain climbers, hurts after and irritates back. He also plays video games. Pain level is 8/10. Heating pad- heats for an hour, Salonpas, hot shower. He is currently working with a Nutritionist. MRI 5/3/21- WNL Pt also mentions he is being managed by Neurologist for migraines.   1/13- Pt feels about the same today. 5-6/10 in the neck and back  1/21/25- Neck and back pain 5-6/10. Felt the best about 2 hours after treatment and then is started slowly creeping back.  1/28- The patient tolerated treatment last visit fairly well, he went to go to the gym, 4/10 neck and back  2/4/25- The pt reports improvements after last visit but then moved a futon.   - The patient is feeling a little better after last visit but he is sore today,  neck and lower back pain will be 6-7/10.  3/4/25- The patient is feeling 5/10 today in the neck and lower back. More of a discomfort and achy feeling.   3/25/25- The patient is feeling sore today 5/10 pain in the neck and back.   4/1/25- Improvements for about an hour after last visit, today pain is a 7/10. Was working out but experiencing progressive overload.   4/8/25- The patient is feeling a little better, but rates 7/10 pain in the neck and lower back.   4/15/25- The patient 5-6/10 in the neck and lower back,  4/22/25- The patient is feeling 4/10 in the neck and back.   5/6/25- The patient reports pain in the neck is 6/10 and the lower back is 5/10,. He has not been good with exercises and stretches.   5/20/25- The pt reports  neck pain is a 5/10 and the back is a 6/10.  5/27/25- The patient reports 5/10 in the neck and 6/10 for the back. Did some self treatment with HA.     Neck Pain   Associated symptoms include headaches.   Back Pain  Associated symptoms include headaches.     Past Medical History:   Diagnosis Date   • Anxiety    • Asthma    • Migraines       Past Surgical History:   Procedure Laterality Date   • CIRCUMCISION       The following portions of the patient's history were reviewed and updated as appropriate: allergies, past family history, past medical history, past social history, past surgical history, and problem list.  Review of Systems   Musculoskeletal:  Positive for back pain, myalgias, neck pain and neck stiffness.   Neurological:  Positive for headaches.     Physical Exam  Constitutional:       Appearance: Normal appearance.     Musculoskeletal:         General: Tenderness present. Normal range of motion.        Arms:       Cervical back: Normal range of motion. Rigidity and tenderness present.     Skin:     General: Skin is warm.     Neurological:      Mental Status: He is alert and oriented to person, place, and time.      Gait: Gait is intact.      Deep Tendon Reflexes: Reflexes are  normal and symmetric.     Psychiatric:         Attention and Perception: Attention normal.         Mood and Affect: Affect normal.         Speech: Speech normal.         Behavior: Behavior is cooperative.         Cognition and Memory: Cognition normal.       SOFT TISSUE ASSESSMENT Hypertonicity and tenderness palpated B C5-T7, Upper trap, lev scap, Sub Occ, SCM, Middle trap, T10-S1 erector spinae, hip flexor, glute med/min, QL, hamstring JOINT RESTRICTIONS: C6-T5, T10-S1 and R SIJ ORTHO: SI jt point tenderness: +; Kelsea unremarkable for centralization/peripheralization; alejandra's, iliac compression, thigh thrust elicit lbp in R/L SIJ; prone femoral nerve stretch neg for upper lumbar neural tension, elicits R SIJ stiffness; sitting root elicits no lbp on R/L; slump test elicits no neural tension R/L, Spurling- neg, Distraction- neg, Max For compr- Neg    Return in about 1 week (around 6/3/2025) for Recheck.

## 2025-06-10 ENCOUNTER — PROCEDURE VISIT (OUTPATIENT)
Age: 25
End: 2025-06-10
Payer: COMMERCIAL

## 2025-06-10 VITALS
WEIGHT: 171 LBS | BODY MASS INDEX: 23.94 KG/M2 | HEART RATE: 78 BPM | DIASTOLIC BLOOD PRESSURE: 72 MMHG | SYSTOLIC BLOOD PRESSURE: 105 MMHG | HEIGHT: 71 IN

## 2025-06-10 DIAGNOSIS — M99.02 SEGMENTAL DYSFUNCTION OF THORACIC REGION: ICD-10-CM

## 2025-06-10 DIAGNOSIS — M99.04 SEGMENTAL DYSFUNCTION OF SACRAL REGION: Primary | ICD-10-CM

## 2025-06-10 DIAGNOSIS — M99.03 SEGMENTAL DYSFUNCTION OF LUMBAR REGION: ICD-10-CM

## 2025-06-10 DIAGNOSIS — M79.18 MYOFASCIAL PAIN SYNDROME: ICD-10-CM

## 2025-06-10 DIAGNOSIS — M99.01 SEGMENTAL DYSFUNCTION OF CERVICAL REGION: ICD-10-CM

## 2025-06-10 DIAGNOSIS — M54.50 LOW BACK PAIN WITHOUT SCIATICA, UNSPECIFIED BACK PAIN LATERALITY, UNSPECIFIED CHRONICITY: ICD-10-CM

## 2025-06-10 PROCEDURE — 98941 CHIROPRACT MANJ 3-4 REGIONS: CPT | Performed by: CHIROPRACTOR

## 2025-06-10 NOTE — PROGRESS NOTES
Initial date of service: 1/2/25    Diagnoses and all orders for this visit:    Segmental dysfunction of sacral region    Segmental dysfunction of lumbar region    Segmental dysfunction of thoracic region    Segmental dysfunction of cervical region    Low back pain without sciatica, unspecified back pain laterality, unspecified chronicity    Myofascial pain syndrome       ASSESSMENT:  Pt's symptoms and exam findings consistent with back pain and neck pain. complicated by myofascial pain syndrome secondary to repetitive st/sp injury, exacerbated by postural/ergonomic stressors. Pt responded well to flexion biased stretches and manual mobilization of the affected spinal and myofascial tissues with increased ROM; trial of conservative tx recommended consisting of stretching, graded mobilization/manipulation of the affected spinal and myofascial jt dysfunction, postural/ergonomic education and take home stretches/exercises. If symptoms fail to improve with short trial of conservative care, appropriate imaging and referral will be coordinated.  - Tolerated treatment well with slight decrease in mm spasm, slow but gradual improvements.    PROCEDURE CODES: 58789    TREATMENT:  Fear avoidance behavior discussion; encouraged and reassured pt that natural course of condition is to improve over time with adherence to tx plan and home care strategies. Home care recommendations: avoid bed rest, walk (but avoid trails and uneven surfaces), gradual return to activity to tolerance (avoid anything that peripheralizes symptoms), call if symptoms peripheralize, worsen, or neurologic deficit progresses. Ther-ex: IASTM; discussed post procedure soreness and/or ecchymosis for up to 36 hrs, applied to affected mm hypertonicities; supine hamstring stretch, supine gluteal stretch, side laying QL stretch, single knee to chest stretch, hip flexor pin-and-stretch, alternating prone hip extension, glute bridge, transitional mvmt education,  abdominal bracing; greater than 15 min spent performing above mentioned ther-ex to improve ROM/flexibility. Cervical mobilization- PA CT junction HVLA, Cervical manual traction, Thoracic mobilization/manipulation: prone P-A mob; Lumbar mobilization/manipulation: diversified side laying graded HVLA, flexion-traction; Sacrum- Mobilization- Long axis traction, flexion-distraction, SIJ Manipulation/Mobilization: R/L SIJ HVLA - long axis distraction, byrd drop table maneuver to affected SIJ    HPI:  Zach Cain is a 24 y.o. male  Chief Complaint   Patient presents with   • Neck - Follow-up     Neck pain score 3   Patient states achy pain    • Back Pain     Lower lumbar pain score 4      Patient states achy pain         The patient presents to the office with lower back and mid back pain since Middle school. The patient reports the lower back is worst. The pt is feeling a little better but then it aggravates it again. The patient went to PT in the past and it made it worse. The patient has went chiro before but only the same adjustment. Not working but did work several years ago at [employer]. Sitting more than 3 hrs aggravates back. Standing for longer then 3 hours. The patient used weights and mountain climbers, hurts after and irritates back. He also plays video games. Pain level is 8/10. Heating pad- heats for an hour, Salonpas, hot shower. He is currently working with a Nutritionist. MRI 5/3/21- WNL Pt also mentions he is being managed by Neurologist for migraines.   1/13- Pt feels about the same today. 5-6/10 in the neck and back  1/21/25- Neck and back pain 5-6/10. Felt the best about 2 hours after treatment and then is started slowly creeping back.  1/28- The patient tolerated treatment last visit fairly well, he went to go to the gym, 4/10 neck and back  2/4/25- The pt reports improvements after last visit but then moved a futon.   - The patient is feeling a little better after last visit but he is sore  today, neck and lower back pain will be 6-7/10.  3/4/25- The patient is feeling 5/10 today in the neck and lower back. More of a discomfort and achy feeling.   3/25/25- The patient is feeling sore today 5/10 pain in the neck and back.   4/1/25- Improvements for about an hour after last visit, today pain is a 7/10. Was working out but experiencing progressive overload.   4/8/25- The patient is feeling a little better, but rates 7/10 pain in the neck and lower back.   4/15/25- The patient 5-6/10 in the neck and lower back,  4/22/25- The patient is feeling 4/10 in the neck and back.   5/6/25- The patient reports pain in the neck is 6/10 and the lower back is 5/10,. He has not been good with exercises and stretches.   5/20/25- The pt reports  neck pain is a 5/10 and the back is a 6/10.  5/27/25- The patient reports 5/10 in the neck and 6/10 for the back. Did some self treatment with HA.   6/10/25- The patient reports having a little less pain in the neck today 3/10 and the lower back is 4/10 today.    Neck Pain   Associated symptoms include headaches.   Back Pain  Associated symptoms include headaches.     Past Medical History:   Diagnosis Date   • Anxiety    • Asthma    • Migraines       Past Surgical History:   Procedure Laterality Date   • CIRCUMCISION       The following portions of the patient's history were reviewed and updated as appropriate: allergies, past family history, past medical history, past social history, past surgical history, and problem list.  Review of Systems   Musculoskeletal:  Positive for back pain, myalgias, neck pain and neck stiffness.   Neurological:  Positive for headaches.     Physical Exam  Constitutional:       Appearance: Normal appearance.     Musculoskeletal:         General: Tenderness present. Normal range of motion.        Arms:       Cervical back: Normal range of motion. Rigidity and tenderness present.     Skin:     General: Skin is warm.     Neurological:      Mental Status:  He is alert and oriented to person, place, and time.      Gait: Gait is intact.      Deep Tendon Reflexes: Reflexes are normal and symmetric.     Psychiatric:         Attention and Perception: Attention normal.         Mood and Affect: Affect normal.         Speech: Speech normal.         Behavior: Behavior is cooperative.         Cognition and Memory: Cognition normal.     SOFT TISSUE ASSESSMENT Hypertonicity and tenderness palpated B C5-T7, Upper trap, lev scap, Sub Occ, SCM, Middle trap, T10-S1 erector spinae, hip flexor, glute med/min, QL, hamstring JOINT RESTRICTIONS: C6-T5, T10-S1 and R SIJ ORTHO: SI jt point tenderness: +; Kelsea unremarkable for centralization/peripheralization; alejandra's, iliac compression, thigh thrust elicit lbp in R/L SIJ; prone femoral nerve stretch neg for upper lumbar neural tension, elicits R SIJ stiffness; sitting root elicits no lbp on R/L; slump test elicits no neural tension R/L, Spurling- neg, Distraction- neg, Max For compr- Neg    Return in about 2 weeks (around 6/24/2025) for Recheck.

## 2025-07-01 ENCOUNTER — PROCEDURE VISIT (OUTPATIENT)
Age: 25
End: 2025-07-01
Payer: COMMERCIAL

## 2025-07-01 VITALS
BODY MASS INDEX: 23.94 KG/M2 | HEART RATE: 73 BPM | HEIGHT: 71 IN | SYSTOLIC BLOOD PRESSURE: 114 MMHG | WEIGHT: 171 LBS | DIASTOLIC BLOOD PRESSURE: 71 MMHG

## 2025-07-01 DIAGNOSIS — M99.01 SEGMENTAL DYSFUNCTION OF CERVICAL REGION: ICD-10-CM

## 2025-07-01 DIAGNOSIS — M99.03 SEGMENTAL DYSFUNCTION OF LUMBAR REGION: ICD-10-CM

## 2025-07-01 DIAGNOSIS — M99.04 SEGMENTAL DYSFUNCTION OF SACRAL REGION: Primary | ICD-10-CM

## 2025-07-01 DIAGNOSIS — M54.50 LOW BACK PAIN WITHOUT SCIATICA, UNSPECIFIED BACK PAIN LATERALITY, UNSPECIFIED CHRONICITY: ICD-10-CM

## 2025-07-01 DIAGNOSIS — M79.18 MYOFASCIAL PAIN SYNDROME: ICD-10-CM

## 2025-07-01 DIAGNOSIS — M99.02 SEGMENTAL DYSFUNCTION OF THORACIC REGION: ICD-10-CM

## 2025-07-01 PROCEDURE — 98941 CHIROPRACT MANJ 3-4 REGIONS: CPT | Performed by: CHIROPRACTOR

## 2025-07-01 NOTE — PROGRESS NOTES
Initial date of service: 1/2/25    Diagnoses and all orders for this visit:    Segmental dysfunction of sacral region    Segmental dysfunction of lumbar region    Segmental dysfunction of thoracic region    Segmental dysfunction of cervical region    Low back pain without sciatica, unspecified back pain laterality, unspecified chronicity    Myofascial pain syndrome       ASSESSMENT:  Pt's symptoms and exam findings consistent with back pain and neck pain. complicated by myofascial pain syndrome secondary to repetitive st/sp injury, exacerbated by postural/ergonomic stressors. Pt responded well to flexion biased stretches and manual mobilization of the affected spinal and myofascial tissues with increased ROM; trial of conservative tx recommended consisting of stretching, graded mobilization/manipulation of the affected spinal and myofascial jt dysfunction, postural/ergonomic education and take home stretches/exercises. If symptoms fail to improve with short trial of conservative care, appropriate imaging and referral will be coordinated.  - Tolerated treatment well with slight decrease in mm spasm, slow but gradual improvements.    PROCEDURE CODES: 95213    TREATMENT:  Fear avoidance behavior discussion; encouraged and reassured pt that natural course of condition is to improve over time with adherence to tx plan and home care strategies. Home care recommendations: avoid bed rest, walk (but avoid trails and uneven surfaces), gradual return to activity to tolerance (avoid anything that peripheralizes symptoms), call if symptoms peripheralize, worsen, or neurologic deficit progresses. Ther-ex: IASTM; discussed post procedure soreness and/or ecchymosis for up to 36 hrs, applied to affected mm hypertonicities; supine hamstring stretch, supine gluteal stretch, side laying QL stretch, single knee to chest stretch, hip flexor pin-and-stretch, alternating prone hip extension, glute bridge, transitional mvmt education,  abdominal bracing; greater than 15 min spent performing above mentioned ther-ex to improve ROM/flexibility. Cervical mobilization- PA CT junction HVLA, Cervical manual traction, Thoracic mobilization/manipulation: prone P-A mob; Lumbar mobilization/manipulation: diversified side laying graded HVLA, flexion-traction; Sacrum- Mobilization- Long axis traction, flexion-distraction, SIJ Manipulation/Mobilization: R/L SIJ HVLA - long axis distraction, byrd drop table maneuver to affected SIJ    HPI:  Zach Cain is a 24 y.o. male  Chief Complaint   Patient presents with   • Neck - Follow-up     Neck pain is good. Pain score 1-2     • Back Pain     Lower lumbar pain score 3        The patient presents to the office with lower back and mid back pain since Middle school. The patient reports the lower back is worst. The pt is feeling a little better but then it aggravates it again. The patient went to PT in the past and it made it worse. The patient has went chiro before but only the same adjustment. Not working but did work several years ago at [employer]. Sitting more than 3 hrs aggravates back. Standing for longer then 3 hours. The patient used weights and mountain climbers, hurts after and irritates back. He also plays video games. Pain level is 8/10. Heating pad- heats for an hour, Salonpas, hot shower. He is currently working with a Nutritionist. MRI 5/3/21- WNL Pt also mentions he is being managed by Neurologist for migraines.   1/13- Pt feels about the same today. 5-6/10 in the neck and back  1/21/25- Neck and back pain 5-6/10. Felt the best about 2 hours after treatment and then is started slowly creeping back.  1/28- The patient tolerated treatment last visit fairly well, he went to go to the gym, 4/10 neck and back  2/4/25- The pt reports improvements after last visit but then moved a futon.   - The patient is feeling a little better after last visit but he is sore today, neck and lower back pain will be  6-7/10.  3/4/25- The patient is feeling 5/10 today in the neck and lower back. More of a discomfort and achy feeling.   3/25/25- The patient is feeling sore today 5/10 pain in the neck and back.   4/1/25- Improvements for about an hour after last visit, today pain is a 7/10. Was working out but experiencing progressive overload.   4/8/25- The patient is feeling a little better, but rates 7/10 pain in the neck and lower back.   4/15/25- The patient 5-6/10 in the neck and lower back,  4/22/25- The patient is feeling 4/10 in the neck and back.   5/6/25- The patient reports pain in the neck is 6/10 and the lower back is 5/10,. He has not been good with exercises and stretches.   5/20/25- The pt reports  neck pain is a 5/10 and the back is a 6/10.  5/27/25- The patient reports 5/10 in the neck and 6/10 for the back. Did some self treatment with HA.   6/10/25- The patient reports having a little less pain in the neck today 3/10 and the lower back is 4/10 today.  7/1/25 The patient lower back pain at a 3/10, neck pain 1-2/10.     Neck Pain   Associated symptoms include headaches.   Back Pain  Associated symptoms include headaches.     Past Medical History:   Diagnosis Date   • Anxiety    • Asthma    • Migraines       Past Surgical History:   Procedure Laterality Date   • CIRCUMCISION       The following portions of the patient's history were reviewed and updated as appropriate: allergies, past family history, past medical history, past social history, past surgical history, and problem list.  Review of Systems   Musculoskeletal:  Positive for back pain, myalgias, neck pain and neck stiffness.   Neurological:  Positive for headaches.     Physical Exam  Constitutional:       Appearance: Normal appearance.     Musculoskeletal:         General: Tenderness present. Normal range of motion.        Arms:       Cervical back: Normal range of motion. Rigidity and tenderness present.     Skin:     General: Skin is warm.      Neurological:      Mental Status: He is alert and oriented to person, place, and time.      Gait: Gait is intact.      Deep Tendon Reflexes: Reflexes are normal and symmetric.     Psychiatric:         Attention and Perception: Attention normal.         Mood and Affect: Affect normal.         Speech: Speech normal.         Behavior: Behavior is cooperative.         Cognition and Memory: Cognition normal.       SOFT TISSUE ASSESSMENT Hypertonicity and tenderness palpated B C5-T7, Upper trap, lev scap, Sub Occ, SCM, Middle trap, T10-S1 erector spinae, hip flexor, glute med/min, QL, hamstring JOINT RESTRICTIONS: C6-T5, T10-S1 and R SIJ ORTHO: SI jt point tenderness: +; Kelsea unremarkable for centralization/peripheralization; alejandra's, iliac compression, thigh thrust elicit lbp in R/L SIJ; prone femoral nerve stretch neg for upper lumbar neural tension, elicits R SIJ stiffness; sitting root elicits no lbp on R/L; slump test elicits no neural tension R/L, Spurling- neg, Distraction- neg, Max For compr- Neg    Return in about 1 week (around 7/8/2025) for Recheck.

## 2025-07-15 ENCOUNTER — PROCEDURE VISIT (OUTPATIENT)
Age: 25
End: 2025-07-15
Payer: COMMERCIAL

## 2025-07-15 VITALS
SYSTOLIC BLOOD PRESSURE: 114 MMHG | BODY MASS INDEX: 23.94 KG/M2 | WEIGHT: 171 LBS | HEART RATE: 65 BPM | HEIGHT: 71 IN | DIASTOLIC BLOOD PRESSURE: 60 MMHG

## 2025-07-15 DIAGNOSIS — M99.03 SEGMENTAL DYSFUNCTION OF LUMBAR REGION: ICD-10-CM

## 2025-07-15 DIAGNOSIS — M79.18 MYOFASCIAL PAIN SYNDROME: ICD-10-CM

## 2025-07-15 DIAGNOSIS — M54.50 LOW BACK PAIN WITHOUT SCIATICA, UNSPECIFIED BACK PAIN LATERALITY, UNSPECIFIED CHRONICITY: ICD-10-CM

## 2025-07-15 DIAGNOSIS — M99.01 SEGMENTAL DYSFUNCTION OF CERVICAL REGION: ICD-10-CM

## 2025-07-15 DIAGNOSIS — M99.02 SEGMENTAL DYSFUNCTION OF THORACIC REGION: ICD-10-CM

## 2025-07-15 DIAGNOSIS — M99.04 SEGMENTAL DYSFUNCTION OF SACRAL REGION: Primary | ICD-10-CM

## 2025-07-15 PROCEDURE — 98941 CHIROPRACT MANJ 3-4 REGIONS: CPT | Performed by: CHIROPRACTOR

## 2025-07-15 RX ORDER — TRETINOIN 0.5 MG/G
CREAM TOPICAL
COMMUNITY
Start: 2025-06-25

## 2025-08-05 ENCOUNTER — PROCEDURE VISIT (OUTPATIENT)
Age: 25
End: 2025-08-05
Payer: COMMERCIAL

## 2025-08-05 VITALS — BODY MASS INDEX: 23.94 KG/M2 | WEIGHT: 171 LBS | HEIGHT: 71 IN

## 2025-08-05 DIAGNOSIS — M79.18 MYOFASCIAL PAIN SYNDROME: ICD-10-CM

## 2025-08-05 DIAGNOSIS — M54.50 LOW BACK PAIN WITHOUT SCIATICA, UNSPECIFIED BACK PAIN LATERALITY, UNSPECIFIED CHRONICITY: ICD-10-CM

## 2025-08-05 DIAGNOSIS — M99.04 SEGMENTAL DYSFUNCTION OF SACRAL REGION: Primary | ICD-10-CM

## 2025-08-05 DIAGNOSIS — M99.03 SEGMENTAL DYSFUNCTION OF LUMBAR REGION: ICD-10-CM

## 2025-08-05 DIAGNOSIS — M99.01 SEGMENTAL DYSFUNCTION OF CERVICAL REGION: ICD-10-CM

## 2025-08-05 DIAGNOSIS — M99.02 SEGMENTAL DYSFUNCTION OF THORACIC REGION: ICD-10-CM

## 2025-08-05 PROCEDURE — 98941 CHIROPRACT MANJ 3-4 REGIONS: CPT | Performed by: CHIROPRACTOR

## 2025-08-12 ENCOUNTER — PROCEDURE VISIT (OUTPATIENT)
Age: 25
End: 2025-08-12
Payer: COMMERCIAL

## 2025-08-21 ENCOUNTER — PROCEDURE VISIT (OUTPATIENT)
Age: 25
End: 2025-08-21
Payer: COMMERCIAL

## 2025-08-21 VITALS
BODY MASS INDEX: 23.94 KG/M2 | DIASTOLIC BLOOD PRESSURE: 69 MMHG | SYSTOLIC BLOOD PRESSURE: 122 MMHG | WEIGHT: 171 LBS | HEART RATE: 70 BPM | HEIGHT: 71 IN

## 2025-08-21 DIAGNOSIS — M99.03 SEGMENTAL DYSFUNCTION OF LUMBAR REGION: ICD-10-CM

## 2025-08-21 DIAGNOSIS — M99.04 SEGMENTAL DYSFUNCTION OF SACRAL REGION: Primary | ICD-10-CM

## 2025-08-21 DIAGNOSIS — M99.01 SEGMENTAL DYSFUNCTION OF CERVICAL REGION: ICD-10-CM

## 2025-08-21 DIAGNOSIS — M54.50 LOW BACK PAIN WITHOUT SCIATICA, UNSPECIFIED BACK PAIN LATERALITY, UNSPECIFIED CHRONICITY: ICD-10-CM

## 2025-08-21 DIAGNOSIS — M99.02 SEGMENTAL DYSFUNCTION OF THORACIC REGION: ICD-10-CM

## 2025-08-21 DIAGNOSIS — M79.18 MYOFASCIAL PAIN SYNDROME: ICD-10-CM

## 2025-08-21 PROCEDURE — 98941 CHIROPRACT MANJ 3-4 REGIONS: CPT | Performed by: CHIROPRACTOR
